# Patient Record
Sex: FEMALE | Race: ASIAN | NOT HISPANIC OR LATINO | URBAN - METROPOLITAN AREA
[De-identification: names, ages, dates, MRNs, and addresses within clinical notes are randomized per-mention and may not be internally consistent; named-entity substitution may affect disease eponyms.]

---

## 2020-02-24 ENCOUNTER — EMERGENCY (EMERGENCY)
Facility: HOSPITAL | Age: 20
LOS: 1 days | Discharge: HOME | End: 2020-02-24
Attending: EMERGENCY MEDICINE | Admitting: EMERGENCY MEDICINE
Payer: MEDICAID

## 2020-02-24 VITALS
DIASTOLIC BLOOD PRESSURE: 64 MMHG | RESPIRATION RATE: 18 BRPM | OXYGEN SATURATION: 100 % | HEART RATE: 70 BPM | TEMPERATURE: 98 F | SYSTOLIC BLOOD PRESSURE: 104 MMHG

## 2020-02-24 LAB
ALBUMIN SERPL ELPH-MCNC: 4.3 G/DL — SIGNIFICANT CHANGE UP (ref 3.3–5)
ALP SERPL-CCNC: 51 U/L — SIGNIFICANT CHANGE UP (ref 40–120)
ALT FLD-CCNC: 15 U/L — SIGNIFICANT CHANGE UP (ref 4–33)
ANION GAP SERPL CALC-SCNC: 13 MMO/L — SIGNIFICANT CHANGE UP (ref 7–14)
AST SERPL-CCNC: 15 U/L — SIGNIFICANT CHANGE UP (ref 4–32)
BILIRUB SERPL-MCNC: 0.7 MG/DL — SIGNIFICANT CHANGE UP (ref 0.2–1.2)
BUN SERPL-MCNC: 9 MG/DL — SIGNIFICANT CHANGE UP (ref 7–23)
CALCIUM SERPL-MCNC: 9.7 MG/DL — SIGNIFICANT CHANGE UP (ref 8.4–10.5)
CHLORIDE SERPL-SCNC: 102 MMOL/L — SIGNIFICANT CHANGE UP (ref 98–107)
CO2 SERPL-SCNC: 22 MMOL/L — SIGNIFICANT CHANGE UP (ref 22–31)
CREAT SERPL-MCNC: 0.65 MG/DL — SIGNIFICANT CHANGE UP (ref 0.5–1.3)
GLUCOSE SERPL-MCNC: 85 MG/DL — SIGNIFICANT CHANGE UP (ref 70–99)
HCT VFR BLD CALC: 37.9 % — SIGNIFICANT CHANGE UP (ref 34.5–45)
HGB BLD-MCNC: 11.7 G/DL — SIGNIFICANT CHANGE UP (ref 11.5–15.5)
MCHC RBC-ENTMCNC: 23.8 PG — LOW (ref 27–34)
MCHC RBC-ENTMCNC: 30.9 % — LOW (ref 32–36)
MCV RBC AUTO: 77.2 FL — LOW (ref 80–100)
NRBC # FLD: 0 K/UL — SIGNIFICANT CHANGE UP (ref 0–0)
PLATELET # BLD AUTO: 164 K/UL — SIGNIFICANT CHANGE UP (ref 150–400)
PMV BLD: 13.5 FL — HIGH (ref 7–13)
POTASSIUM SERPL-MCNC: 3.9 MMOL/L — SIGNIFICANT CHANGE UP (ref 3.5–5.3)
POTASSIUM SERPL-SCNC: 3.9 MMOL/L — SIGNIFICANT CHANGE UP (ref 3.5–5.3)
PROT SERPL-MCNC: 7.7 G/DL — SIGNIFICANT CHANGE UP (ref 6–8.3)
RBC # BLD: 4.91 M/UL — SIGNIFICANT CHANGE UP (ref 3.8–5.2)
RBC # FLD: 14.2 % — SIGNIFICANT CHANGE UP (ref 10.3–14.5)
SODIUM SERPL-SCNC: 137 MMOL/L — SIGNIFICANT CHANGE UP (ref 135–145)
WBC # BLD: 5.83 K/UL — SIGNIFICANT CHANGE UP (ref 3.8–10.5)
WBC # FLD AUTO: 5.83 K/UL — SIGNIFICANT CHANGE UP (ref 3.8–10.5)

## 2020-02-24 PROCEDURE — 99284 EMERGENCY DEPT VISIT MOD MDM: CPT

## 2020-02-24 RX ORDER — METOCLOPRAMIDE HCL 10 MG
10 TABLET ORAL ONCE
Refills: 0 | Status: COMPLETED | OUTPATIENT
Start: 2020-02-24 | End: 2020-02-24

## 2020-02-24 RX ORDER — KETOROLAC TROMETHAMINE 30 MG/ML
15 SYRINGE (ML) INJECTION ONCE
Refills: 0 | Status: DISCONTINUED | OUTPATIENT
Start: 2020-02-24 | End: 2020-02-24

## 2020-02-24 RX ORDER — SODIUM CHLORIDE 9 MG/ML
1000 INJECTION INTRAMUSCULAR; INTRAVENOUS; SUBCUTANEOUS ONCE
Refills: 0 | Status: COMPLETED | OUTPATIENT
Start: 2020-02-24 | End: 2020-02-24

## 2020-02-24 RX ADMIN — Medication 15 MILLIGRAM(S): at 16:27

## 2020-02-24 RX ADMIN — Medication 10 MILLIGRAM(S): at 16:00

## 2020-02-24 RX ADMIN — SODIUM CHLORIDE 1000 MILLILITER(S): 9 INJECTION INTRAMUSCULAR; INTRAVENOUS; SUBCUTANEOUS at 16:00

## 2020-02-24 NOTE — ED PROVIDER NOTE - NS_ ATTENDINGSCRIBEDETAILS _ED_A_ED_FT
DR. BLOCH, ATTENDING MD-  I performed a face to face bedside interview with patient regarding history of present illness, review of symptoms and past medical history. I completed an independent physical exam.  I have discussed patient's plan of care with the resident.

## 2020-02-24 NOTE — ED PROVIDER NOTE - PATIENT PORTAL LINK FT
You can access the FollowMyHealth Patient Portal offered by [facility] by registering at the following website: [email or web address]. By joining [employer]’s FollowMyHealth portal, you will also be able to view your health information using other applications (apps) compatible with our system.

## 2020-02-24 NOTE — ED PROVIDER NOTE - CLINICAL SUMMARY MEDICAL DECISION MAKING FREE TEXT BOX
no neuro deficits; afebrile; neck supple; has imaging just pending results; feels better after NSAIDs and reglan will d/c home

## 2020-02-24 NOTE — ED PROVIDER NOTE - OBJECTIVE STATEMENT
19 yr old feamle with no sig PMH presents with headache, states intermittent for last 1-2 years.  WEnt to to see neurologist and  states 3 imaging tests done which included MRI, to get results 3/10.  Pt had headache today similar to prior prompting her to come in.  Denies visual changes, vomiting, LOCm weakness, numbness.

## 2020-02-24 NOTE — ED PROVIDER NOTE - RAPID ASSESSMENT
18 y/o F w/ no pertinent PMH presents to the ED c/o headache, rated 8/10, since this morning. +Dizziness. +Photophobia. Pt has been having headaches for past 1.5 years and follows with a neurologist. Pt is currently waiting for results of an MRI she did 1 week ago. Pt has not taken any pain medication. Denies any cough, congestion, blurry vision, weakness, numbness, nausea, or any other acute complaints. NKDA. Initial face to face assessment completed by Dr. Bloch. Patient is well appearing and in NAD. Initial orders placed for patient. Will hand off patient to a second provider for further evaluation and management.

## 2020-02-24 NOTE — ED ADULT NURSE NOTE - OBJECTIVE STATEMENT
Pt rec'd in intake, c/o generalized intermittent headache for the past 1.5 years, associated with dizziness. Denies N/V

## 2020-02-24 NOTE — ED ADULT TRIAGE NOTE - CHIEF COMPLAINT QUOTE
pt. c/o a headache x 1 year, states she saw a neurologist 2 weeks ago, had an MRI completed but no results are yet available. Denies nausea/vomiting. Denies PMHx.

## 2020-02-24 NOTE — ED PROVIDER NOTE - NSFOLLOWUPINSTRUCTIONS_ED_ALL_ED_FT
Follow up with your neurologist as scheduled  Return to ED for worsening headache, vomiting, any motor weakness or numbness or any other complaints in which you feel you require immediate treatment

## 2020-08-17 ENCOUNTER — EMERGENCY (EMERGENCY)
Facility: HOSPITAL | Age: 20
LOS: 1 days | Discharge: HOME | End: 2020-08-17
Attending: STUDENT IN AN ORGANIZED HEALTH CARE EDUCATION/TRAINING PROGRAM | Admitting: STUDENT IN AN ORGANIZED HEALTH CARE EDUCATION/TRAINING PROGRAM
Payer: MEDICAID

## 2020-08-17 VITALS
HEART RATE: 125 BPM | SYSTOLIC BLOOD PRESSURE: 122 MMHG | DIASTOLIC BLOOD PRESSURE: 46 MMHG | RESPIRATION RATE: 20 BRPM | TEMPERATURE: 102 F | OXYGEN SATURATION: 97 %

## 2020-08-17 VITALS
HEART RATE: 111 BPM | RESPIRATION RATE: 16 BRPM | DIASTOLIC BLOOD PRESSURE: 48 MMHG | SYSTOLIC BLOOD PRESSURE: 91 MMHG | OXYGEN SATURATION: 100 % | TEMPERATURE: 98 F

## 2020-08-17 LAB
ALBUMIN SERPL ELPH-MCNC: 4.8 G/DL — SIGNIFICANT CHANGE UP (ref 3.3–5)
ALP SERPL-CCNC: 65 U/L — SIGNIFICANT CHANGE UP (ref 40–120)
ALT FLD-CCNC: 15 U/L — SIGNIFICANT CHANGE UP (ref 4–33)
ANION GAP SERPL CALC-SCNC: 14 MMO/L — SIGNIFICANT CHANGE UP (ref 7–14)
APPEARANCE UR: CLEAR — SIGNIFICANT CHANGE UP
APTT BLD: 30.4 SEC — SIGNIFICANT CHANGE UP (ref 27–36.3)
AST SERPL-CCNC: 17 U/L — SIGNIFICANT CHANGE UP (ref 4–32)
BASOPHILS # BLD AUTO: 0.02 K/UL — SIGNIFICANT CHANGE UP (ref 0–0.2)
BASOPHILS NFR BLD AUTO: 0.2 % — SIGNIFICANT CHANGE UP (ref 0–2)
BILIRUB SERPL-MCNC: 1 MG/DL — SIGNIFICANT CHANGE UP (ref 0.2–1.2)
BILIRUB UR-MCNC: NEGATIVE — SIGNIFICANT CHANGE UP
BLOOD UR QL VISUAL: NEGATIVE — SIGNIFICANT CHANGE UP
BUN SERPL-MCNC: 11 MG/DL — SIGNIFICANT CHANGE UP (ref 7–23)
CALCIUM SERPL-MCNC: 9.8 MG/DL — SIGNIFICANT CHANGE UP (ref 8.4–10.5)
CHLORIDE SERPL-SCNC: 101 MMOL/L — SIGNIFICANT CHANGE UP (ref 98–107)
CO2 SERPL-SCNC: 22 MMOL/L — SIGNIFICANT CHANGE UP (ref 22–31)
COLOR SPEC: COLORLESS — SIGNIFICANT CHANGE UP
CREAT SERPL-MCNC: 0.68 MG/DL — SIGNIFICANT CHANGE UP (ref 0.5–1.3)
EOSINOPHIL # BLD AUTO: 0 K/UL — SIGNIFICANT CHANGE UP (ref 0–0.5)
EOSINOPHIL NFR BLD AUTO: 0 % — SIGNIFICANT CHANGE UP (ref 0–6)
GLUCOSE SERPL-MCNC: 92 MG/DL — SIGNIFICANT CHANGE UP (ref 70–99)
GLUCOSE UR-MCNC: NEGATIVE — SIGNIFICANT CHANGE UP
HCT VFR BLD CALC: 36.4 % — SIGNIFICANT CHANGE UP (ref 34.5–45)
HGB BLD-MCNC: 11.9 G/DL — SIGNIFICANT CHANGE UP (ref 11.5–15.5)
IMM GRANULOCYTES NFR BLD AUTO: 0.5 % — SIGNIFICANT CHANGE UP (ref 0–1.5)
INR BLD: 1.09 — SIGNIFICANT CHANGE UP (ref 0.88–1.16)
KETONES UR-MCNC: NEGATIVE — SIGNIFICANT CHANGE UP
LACTATE BLDV-MCNC: 1.8 MMOL/L — SIGNIFICANT CHANGE UP (ref 0.5–2)
LEUKOCYTE ESTERASE UR-ACNC: NEGATIVE — SIGNIFICANT CHANGE UP
LYMPHOCYTES # BLD AUTO: 0.68 K/UL — LOW (ref 1–3.3)
LYMPHOCYTES # BLD AUTO: 6.4 % — LOW (ref 13–44)
MCHC RBC-ENTMCNC: 23.6 PG — LOW (ref 27–34)
MCHC RBC-ENTMCNC: 32.7 % — SIGNIFICANT CHANGE UP (ref 32–36)
MCV RBC AUTO: 72.2 FL — LOW (ref 80–100)
MONOCYTES # BLD AUTO: 0.46 K/UL — SIGNIFICANT CHANGE UP (ref 0–0.9)
MONOCYTES NFR BLD AUTO: 4.3 % — SIGNIFICANT CHANGE UP (ref 2–14)
NEUTROPHILS # BLD AUTO: 9.45 K/UL — HIGH (ref 1.8–7.4)
NEUTROPHILS NFR BLD AUTO: 88.6 % — HIGH (ref 43–77)
NITRITE UR-MCNC: NEGATIVE — SIGNIFICANT CHANGE UP
NRBC # FLD: 0 K/UL — SIGNIFICANT CHANGE UP (ref 0–0)
PH UR: 6.5 — SIGNIFICANT CHANGE UP (ref 5–8)
PLATELET # BLD AUTO: 170 K/UL — SIGNIFICANT CHANGE UP (ref 150–400)
PMV BLD: 12 FL — SIGNIFICANT CHANGE UP (ref 7–13)
POTASSIUM SERPL-MCNC: 3.4 MMOL/L — LOW (ref 3.5–5.3)
POTASSIUM SERPL-SCNC: 3.4 MMOL/L — LOW (ref 3.5–5.3)
PROCALCITONIN SERPL-MCNC: 0.28 NG/ML — HIGH (ref 0.02–0.1)
PROT SERPL-MCNC: 7.9 G/DL — SIGNIFICANT CHANGE UP (ref 6–8.3)
PROT UR-MCNC: NEGATIVE — SIGNIFICANT CHANGE UP
PROTHROM AB SERPL-ACNC: 12.5 SEC — SIGNIFICANT CHANGE UP (ref 10.6–13.6)
RBC # BLD: 5.04 M/UL — SIGNIFICANT CHANGE UP (ref 3.8–5.2)
RBC # FLD: 14.3 % — SIGNIFICANT CHANGE UP (ref 10.3–14.5)
SODIUM SERPL-SCNC: 137 MMOL/L — SIGNIFICANT CHANGE UP (ref 135–145)
SP GR SPEC: 1.01 — SIGNIFICANT CHANGE UP (ref 1–1.04)
UROBILINOGEN FLD QL: NORMAL — SIGNIFICANT CHANGE UP
WBC # BLD: 10.66 K/UL — HIGH (ref 3.8–10.5)
WBC # FLD AUTO: 10.66 K/UL — HIGH (ref 3.8–10.5)

## 2020-08-17 PROCEDURE — 93010 ELECTROCARDIOGRAM REPORT: CPT

## 2020-08-17 PROCEDURE — 71045 X-RAY EXAM CHEST 1 VIEW: CPT | Mod: 26

## 2020-08-17 PROCEDURE — 99284 EMERGENCY DEPT VISIT MOD MDM: CPT | Mod: 25

## 2020-08-17 RX ORDER — IBUPROFEN 200 MG
600 TABLET ORAL ONCE
Refills: 0 | Status: COMPLETED | OUTPATIENT
Start: 2020-08-17 | End: 2020-08-17

## 2020-08-17 RX ORDER — IBUPROFEN 200 MG
400 TABLET ORAL ONCE
Refills: 0 | Status: DISCONTINUED | OUTPATIENT
Start: 2020-08-17 | End: 2020-08-17

## 2020-08-17 RX ORDER — ACETAMINOPHEN 500 MG
650 TABLET ORAL ONCE
Refills: 0 | Status: COMPLETED | OUTPATIENT
Start: 2020-08-17 | End: 2020-08-17

## 2020-08-17 RX ORDER — SODIUM CHLORIDE 9 MG/ML
1000 INJECTION INTRAMUSCULAR; INTRAVENOUS; SUBCUTANEOUS ONCE
Refills: 0 | Status: COMPLETED | OUTPATIENT
Start: 2020-08-17 | End: 2020-08-17

## 2020-08-17 RX ORDER — SODIUM CHLORIDE 9 MG/ML
2000 INJECTION INTRAMUSCULAR; INTRAVENOUS; SUBCUTANEOUS ONCE
Refills: 0 | Status: COMPLETED | OUTPATIENT
Start: 2020-08-17 | End: 2020-08-17

## 2020-08-17 RX ORDER — POTASSIUM CHLORIDE 20 MEQ
40 PACKET (EA) ORAL ONCE
Refills: 0 | Status: COMPLETED | OUTPATIENT
Start: 2020-08-17 | End: 2020-08-17

## 2020-08-17 RX ORDER — METOCLOPRAMIDE HCL 10 MG
10 TABLET ORAL ONCE
Refills: 0 | Status: COMPLETED | OUTPATIENT
Start: 2020-08-17 | End: 2020-08-17

## 2020-08-17 RX ADMIN — Medication 650 MILLIGRAM(S): at 18:34

## 2020-08-17 RX ADMIN — Medication 650 MILLIGRAM(S): at 19:29

## 2020-08-17 RX ADMIN — Medication 10 MILLIGRAM(S): at 22:23

## 2020-08-17 RX ADMIN — SODIUM CHLORIDE 1000 MILLILITER(S): 9 INJECTION INTRAMUSCULAR; INTRAVENOUS; SUBCUTANEOUS at 22:32

## 2020-08-17 RX ADMIN — Medication 40 MILLIEQUIVALENT(S): at 20:04

## 2020-08-17 RX ADMIN — Medication 600 MILLIGRAM(S): at 21:26

## 2020-08-17 RX ADMIN — Medication 600 MILLIGRAM(S): at 22:32

## 2020-08-17 RX ADMIN — SODIUM CHLORIDE 2000 MILLILITER(S): 9 INJECTION INTRAMUSCULAR; INTRAVENOUS; SUBCUTANEOUS at 18:34

## 2020-08-17 RX ADMIN — SODIUM CHLORIDE 1000 MILLILITER(S): 9 INJECTION INTRAMUSCULAR; INTRAVENOUS; SUBCUTANEOUS at 21:26

## 2020-08-17 NOTE — ED ADULT TRIAGE NOTE - CHIEF COMPLAINT QUOTE
p/t c/o of sudden onset of chills and fever since this afternoon, denies any recent travel, denies cough, p/t appears uncomfortable

## 2020-08-17 NOTE — ED PROVIDER NOTE - PATIENT PORTAL LINK FT
You can access the FollowMyHealth Patient Portal offered by [facility] by registering at the following website: [email or web address]. By joining [employer]’s FollowMyHealth portal, you will also be able to view your health information using other applications (apps) compatible with our system.

## 2020-08-17 NOTE — ED PROVIDER NOTE - PROGRESS NOTE DETAILS
Dr. Prince: I have personally seen and examined this patient at the bedside. I have fully participated in the care of this patient. I have reviewed all pertinent clinical information, including history, physical exam, plan and the Resident's note and agree except as noted. HPI above as by me. PE above as by me. DDX PLAN Steph MATT: Patient reassessed, reports improvement, successfully PO challenged, ambulated with a steady gait. BP: 102/61

## 2020-08-17 NOTE — ED PROVIDER NOTE - OBJECTIVE STATEMENT
Pt a 20F hx migraines p/f fever, chills, headache, since "this afternoon". Pt has also noticed she's been urinating more often. Denies cough, n/v, cp, sob, abdominal pain, dysuria, hematuria, vaginal discharge, diarrhea, neck stiffness, rashes. Pt a 20F hx migraines p/f fever, chills, headache, since "this afternoon". Pt has also noticed she's been urinating more often. Denies cough, n/v, cp, sob, abdominal pain, dysuria, hematuria, vaginal discharge, diarrhea, neck stiffness, rashes.    Pt's  is reachable at [phone] and would like updates as w/u progresses.

## 2020-08-17 NOTE — ED PROVIDER NOTE - ATTENDING CONTRIBUTION TO CARE
19 y/o F with PMH migraines p/w fever and chills x 1 day. Pt reports having chills and fever tmax 102 today. She reports having increased urination over the last few days. She denies cough, chest pain, dizziness recent, trauma or travel. LMP was 1 month prior. She reports having mild headache when fever started, denies neck stiffness.   denies +fever, +chills, chest pain, SOB, abdominal pain, diarrhea, dysuria, syncope, bleeding, new rash, weakness, numbness, blurred vision    ROS  otherwise negative as per HPI  Gen: Awake, Alert, WD, WN, NAD  Head:  NC/AT  Eyes:  PERRL, EOMI, Conjunctiva pink, lids normal, no scleral icterus  ENT: OP clear, no exudates, no erythema, uvula midline,  moist mucus membranes  Neck: supple, nontender, no meningismus, no JVD, trachea midline  Cardiac/CV:  S1 S2, RRR, no M/G/R  Respiratory/Pulm:  CTAB, good air movement, normal resp effort, no wheezes/stridor/retractions/rales/rhonchi  Gastrointestinal/Abdomen:  Soft, nontender, nondistended, +BS, no rebound/guarding  Back:  no CVAT, no MLT  Ext:  warm, well perfused, moving all extremities spontaneously, no peripheral edema, distal pulses intact  Skin: intact, no rash  Neuro:  AAOx3, sensation intact, motor 5/5 x 4 extremities, normal gait, speech clear  MDM as above 21 y/o F with PMH depression,  migraines p/w fever and chills x 1 day. Pt reports having chills and fever tmax 102 today. She reports having increased urination over the last few days. She denies cough, chest pain, dizziness recent, trauma or travel. LMP was 1 month prior. She reports having mild headache when fever started, denies neck stiffness. Denies HI/SI  denies +fever, +chills, chest pain, SOB, abdominal pain, diarrhea, dysuria, syncope, bleeding, new rash, weakness, numbness, blurred vision    ROS  otherwise negative as per HPI  Gen: Awake, Alert, WD, WN, NAD  Head:  NC/AT  Eyes:  PERRL, EOMI, Conjunctiva pink, lids normal, no scleral icterus  ENT: OP clear, no exudates, no erythema, uvula midline,  moist mucus membranes  Neck: supple, nontender, no meningismus, no JVD, trachea midline  Cardiac/CV:  S1 S2, RRR, no M/G/R  Respiratory/Pulm:  CTAB, good air movement, normal resp effort, no wheezes/stridor/retractions/rales/rhonchi  Gastrointestinal/Abdomen:  Soft, nontender, nondistended, +BS, no rebound/guarding  Back:  no CVAT, no MLT  Ext:  warm, well perfused, moving all extremities spontaneously, no peripheral edema, distal pulses intact  Skin: superficial scars from cutting on wrists in past   Neuro:  AAOx3, sensation intact, motor 5/5 x 4 extremities, normal gait, speech clear  MDM as above

## 2020-08-17 NOTE — ED PROVIDER NOTE - CLINICAL SUMMARY MEDICAL DECISION MAKING FREE TEXT BOX
20F p/f f/c, sepsis vitals on arrival; (tachy, febrile), endorses urinary frequency and headache, pt w/ supple neck, no nuchal rigidity, abdomen soft ntnd, Lungs clear. Plan - labs, cultures, urine, cxr, fluids, abx, reassess. 20F p/f f/c, sepsis vitals on arrival; (tachy, febrile), endorses urinary frequency and headache, pt w/ supple neck, no nuchal rigidity, abdomen soft ntnd, Lungs clear. Plan - labs, cultures, urine, cxr, fluids, abx, reassess. likely uti no cva tendenress

## 2020-08-17 NOTE — ED ADULT NURSE NOTE - OBJECTIVE STATEMENT
pt received in room 11 A&OX4 c/o sudden onset fever and chills, headache x today. Pt denies CP, SOB, dysuria/hematuria, abdominal pain. Pt does report increased urinary frequency. pt slightly tachypneic, sinus tachycardic on cardiac monitor. Pt febrile upon arrival. Abd soft non distended. 20G IV placed to R AC. Labs drawn and sent. Will continue to monitor.

## 2020-08-18 ENCOUNTER — INPATIENT (INPATIENT)
Facility: HOSPITAL | Age: 20
LOS: 2 days | Discharge: HOME | End: 2020-08-21
Attending: HOSPITALIST | Admitting: HOSPITALIST
Payer: MEDICAID

## 2020-08-18 VITALS
SYSTOLIC BLOOD PRESSURE: 115 MMHG | RESPIRATION RATE: 16 BRPM | TEMPERATURE: 98 F | HEART RATE: 101 BPM | DIASTOLIC BLOOD PRESSURE: 61 MMHG | OXYGEN SATURATION: 100 %

## 2020-08-18 DIAGNOSIS — R78.81 BACTEREMIA: ICD-10-CM

## 2020-08-18 LAB
ALBUMIN SERPL ELPH-MCNC: 4.6 G/DL — SIGNIFICANT CHANGE UP (ref 3.3–5)
ALP SERPL-CCNC: 64 U/L — SIGNIFICANT CHANGE UP (ref 40–120)
ALT FLD-CCNC: 24 U/L — SIGNIFICANT CHANGE UP (ref 4–33)
ANION GAP SERPL CALC-SCNC: 13 MMO/L — SIGNIFICANT CHANGE UP (ref 7–14)
APPEARANCE UR: CLEAR — SIGNIFICANT CHANGE UP
APTT BLD: 29.7 SEC — SIGNIFICANT CHANGE UP (ref 27–36.3)
AST SERPL-CCNC: 28 U/L — SIGNIFICANT CHANGE UP (ref 4–32)
BASE EXCESS BLDV CALC-SCNC: -0.5 MMOL/L — SIGNIFICANT CHANGE UP
BASOPHILS # BLD AUTO: 0.03 K/UL — SIGNIFICANT CHANGE UP (ref 0–0.2)
BASOPHILS NFR BLD AUTO: 0.4 % — SIGNIFICANT CHANGE UP (ref 0–2)
BILIRUB SERPL-MCNC: 0.4 MG/DL — SIGNIFICANT CHANGE UP (ref 0.2–1.2)
BILIRUB UR-MCNC: NEGATIVE — SIGNIFICANT CHANGE UP
BLOOD GAS VENOUS - CREATININE: 0.82 MG/DL — SIGNIFICANT CHANGE UP (ref 0.5–1.3)
BLOOD UR QL VISUAL: NEGATIVE — SIGNIFICANT CHANGE UP
BUN SERPL-MCNC: 9 MG/DL — SIGNIFICANT CHANGE UP (ref 7–23)
CALCIUM SERPL-MCNC: 9.2 MG/DL — SIGNIFICANT CHANGE UP (ref 8.4–10.5)
CHLORIDE BLDV-SCNC: 106 MMOL/L — SIGNIFICANT CHANGE UP (ref 96–108)
CHLORIDE SERPL-SCNC: 102 MMOL/L — SIGNIFICANT CHANGE UP (ref 98–107)
CO2 SERPL-SCNC: 23 MMOL/L — SIGNIFICANT CHANGE UP (ref 22–31)
COLOR SPEC: COLORLESS — SIGNIFICANT CHANGE UP
CREAT SERPL-MCNC: 0.77 MG/DL — SIGNIFICANT CHANGE UP (ref 0.5–1.3)
CULTURE RESULTS: SIGNIFICANT CHANGE UP
ENTEROCOC DNA BLD POS QL NAA+NON-PROBE: SIGNIFICANT CHANGE UP
EOSINOPHIL # BLD AUTO: 0 K/UL — SIGNIFICANT CHANGE UP (ref 0–0.5)
EOSINOPHIL NFR BLD AUTO: 0 % — SIGNIFICANT CHANGE UP (ref 0–6)
GAS PNL BLDV: 138 MMOL/L — SIGNIFICANT CHANGE UP (ref 136–146)
GLUCOSE BLDV-MCNC: 95 MG/DL — SIGNIFICANT CHANGE UP (ref 70–99)
GLUCOSE SERPL-MCNC: 93 MG/DL — SIGNIFICANT CHANGE UP (ref 70–99)
GLUCOSE UR-MCNC: NEGATIVE — SIGNIFICANT CHANGE UP
GRAM STN FLD: SIGNIFICANT CHANGE UP
HCO3 BLDV-SCNC: 23 MMOL/L — SIGNIFICANT CHANGE UP (ref 20–27)
HCT VFR BLD CALC: 36 % — SIGNIFICANT CHANGE UP (ref 34.5–45)
HCT VFR BLDV CALC: 37.6 % — SIGNIFICANT CHANGE UP (ref 34.5–45)
HGB BLD-MCNC: 11.5 G/DL — SIGNIFICANT CHANGE UP (ref 11.5–15.5)
HGB BLDV-MCNC: 12.2 G/DL — SIGNIFICANT CHANGE UP (ref 11.5–15.5)
IMM GRANULOCYTES NFR BLD AUTO: 0.5 % — SIGNIFICANT CHANGE UP (ref 0–1.5)
INR BLD: 1.2 — HIGH (ref 0.88–1.16)
KETONES UR-MCNC: NEGATIVE — SIGNIFICANT CHANGE UP
LACTATE BLDV-MCNC: 1.5 MMOL/L — SIGNIFICANT CHANGE UP (ref 0.5–2)
LEUKOCYTE ESTERASE UR-ACNC: NEGATIVE — SIGNIFICANT CHANGE UP
LYMPHOCYTES # BLD AUTO: 0.69 K/UL — LOW (ref 1–3.3)
LYMPHOCYTES # BLD AUTO: 8.3 % — LOW (ref 13–44)
MCHC RBC-ENTMCNC: 23.8 PG — LOW (ref 27–34)
MCHC RBC-ENTMCNC: 31.9 % — LOW (ref 32–36)
MCV RBC AUTO: 74.5 FL — LOW (ref 80–100)
METHOD TYPE: SIGNIFICANT CHANGE UP
MONOCYTES # BLD AUTO: 0.71 K/UL — SIGNIFICANT CHANGE UP (ref 0–0.9)
MONOCYTES NFR BLD AUTO: 8.6 % — SIGNIFICANT CHANGE UP (ref 2–14)
NEUTROPHILS # BLD AUTO: 6.82 K/UL — SIGNIFICANT CHANGE UP (ref 1.8–7.4)
NEUTROPHILS NFR BLD AUTO: 82.2 % — HIGH (ref 43–77)
NITRITE UR-MCNC: NEGATIVE — SIGNIFICANT CHANGE UP
NRBC # FLD: 0 K/UL — SIGNIFICANT CHANGE UP (ref 0–0)
PCO2 BLDV: 46 MMHG — SIGNIFICANT CHANGE UP (ref 41–51)
PH BLDV: 7.35 PH — SIGNIFICANT CHANGE UP (ref 7.32–7.43)
PH UR: 7 — SIGNIFICANT CHANGE UP (ref 5–8)
PLATELET # BLD AUTO: 169 K/UL — SIGNIFICANT CHANGE UP (ref 150–400)
PMV BLD: 12.8 FL — SIGNIFICANT CHANGE UP (ref 7–13)
PO2 BLDV: 25 MMHG — LOW (ref 35–40)
POTASSIUM BLDV-SCNC: 5.3 MMOL/L — HIGH (ref 3.4–4.5)
POTASSIUM SERPL-MCNC: 3.7 MMOL/L — SIGNIFICANT CHANGE UP (ref 3.5–5.3)
POTASSIUM SERPL-SCNC: 3.7 MMOL/L — SIGNIFICANT CHANGE UP (ref 3.5–5.3)
PROT SERPL-MCNC: 7.8 G/DL — SIGNIFICANT CHANGE UP (ref 6–8.3)
PROT UR-MCNC: NEGATIVE — SIGNIFICANT CHANGE UP
PROTHROM AB SERPL-ACNC: 13.6 SEC — SIGNIFICANT CHANGE UP (ref 10.6–13.6)
RBC # BLD: 4.83 M/UL — SIGNIFICANT CHANGE UP (ref 3.8–5.2)
RBC # FLD: 14.7 % — HIGH (ref 10.3–14.5)
SAO2 % BLDV: 38.7 % — LOW (ref 60–85)
SARS-COV-2 RNA SPEC QL NAA+PROBE: SIGNIFICANT CHANGE UP
SODIUM SERPL-SCNC: 138 MMOL/L — SIGNIFICANT CHANGE UP (ref 135–145)
SP GR SPEC: 1.01 — SIGNIFICANT CHANGE UP (ref 1–1.04)
SPECIMEN SOURCE: SIGNIFICANT CHANGE UP
SPECIMEN SOURCE: SIGNIFICANT CHANGE UP
UROBILINOGEN FLD QL: NORMAL — SIGNIFICANT CHANGE UP
WBC # BLD: 8.29 K/UL — SIGNIFICANT CHANGE UP (ref 3.8–10.5)
WBC # FLD AUTO: 8.29 K/UL — SIGNIFICANT CHANGE UP (ref 3.8–10.5)

## 2020-08-18 PROCEDURE — 71046 X-RAY EXAM CHEST 2 VIEWS: CPT | Mod: 26

## 2020-08-18 PROCEDURE — 99285 EMERGENCY DEPT VISIT HI MDM: CPT

## 2020-08-18 RX ORDER — ENOXAPARIN SODIUM 100 MG/ML
40 INJECTION SUBCUTANEOUS DAILY
Refills: 0 | Status: DISCONTINUED | OUTPATIENT
Start: 2020-08-18 | End: 2020-08-21

## 2020-08-18 RX ORDER — CEFTRIAXONE 500 MG/1
1000 INJECTION, POWDER, FOR SOLUTION INTRAMUSCULAR; INTRAVENOUS EVERY 24 HOURS
Refills: 0 | Status: DISCONTINUED | OUTPATIENT
Start: 2020-08-19 | End: 2020-08-19

## 2020-08-18 RX ORDER — CEFTRIAXONE 500 MG/1
1000 INJECTION, POWDER, FOR SOLUTION INTRAMUSCULAR; INTRAVENOUS ONCE
Refills: 0 | Status: DISCONTINUED | OUTPATIENT
Start: 2020-08-18 | End: 2020-08-18

## 2020-08-18 RX ORDER — ACETAMINOPHEN 500 MG
650 TABLET ORAL ONCE
Refills: 0 | Status: COMPLETED | OUTPATIENT
Start: 2020-08-18 | End: 2020-08-18

## 2020-08-18 RX ORDER — SODIUM CHLORIDE 9 MG/ML
2000 INJECTION INTRAMUSCULAR; INTRAVENOUS; SUBCUTANEOUS ONCE
Refills: 0 | Status: COMPLETED | OUTPATIENT
Start: 2020-08-18 | End: 2020-08-18

## 2020-08-18 RX ORDER — VANCOMYCIN HCL 1 G
1000 VIAL (EA) INTRAVENOUS ONCE
Refills: 0 | Status: COMPLETED | OUTPATIENT
Start: 2020-08-18 | End: 2020-08-18

## 2020-08-18 RX ORDER — ACETAMINOPHEN 500 MG
650 TABLET ORAL EVERY 6 HOURS
Refills: 0 | Status: DISCONTINUED | OUTPATIENT
Start: 2020-08-18 | End: 2020-08-21

## 2020-08-18 RX ORDER — CEFTRIAXONE 500 MG/1
1 INJECTION, POWDER, FOR SOLUTION INTRAMUSCULAR; INTRAVENOUS ONCE
Refills: 0 | Status: DISCONTINUED | OUTPATIENT
Start: 2020-08-18 | End: 2020-08-18

## 2020-08-18 RX ORDER — CEFTRIAXONE 500 MG/1
1000 INJECTION, POWDER, FOR SOLUTION INTRAMUSCULAR; INTRAVENOUS ONCE
Refills: 0 | Status: COMPLETED | OUTPATIENT
Start: 2020-08-18 | End: 2020-08-18

## 2020-08-18 RX ADMIN — SODIUM CHLORIDE 1000 MILLILITER(S): 9 INJECTION INTRAMUSCULAR; INTRAVENOUS; SUBCUTANEOUS at 21:52

## 2020-08-18 RX ADMIN — Medication 250 MILLIGRAM(S): at 22:16

## 2020-08-18 RX ADMIN — Medication 650 MILLIGRAM(S): at 21:30

## 2020-08-18 NOTE — ED PROVIDER NOTE - ATTENDING CONTRIBUTION TO CARE
I have seen and examined the patient on the patient´s visit date. I have reviewed the note written by Parker Hearn  [facility], on that visit day. I have supervised and participated as necessary in the performance of procedures indicated for patient management and was available at all phases of the patient´s visit when needed. We discussed the history, physical exam findings, mnagement plan, and  medical decision making. I have made my additons, exceptions, and revisions within the chart and I agree with H and P as documented in its entirety. The data and my interpretation of any data collected from labs, interventions and imaging appear below as well as my independent medical decision making and considerations    The patient is a 20y Female who has no pertinent past medical history PTED with fever and positive blood cultures after being seen in ED yesterday for same. Tmax 103 at home with good effect. Patient c/o headache but non focal and no nuchal rigidity   Vital Signs Last 24 Hrs  T(F): 98.5 HR: 101 BP: 115/61 RR: 16 SpO2: 100% (18 Aug 2020 19:00) (100% - 100%)  PE: as described; my additions and exceptions are noted in the chart    Lab Results From Yesterday Reviewed :                        11.9   10.66 )-----------( 170      ( 17 Aug 2020 18:10 )             36.4     08-    137  |  101  |  11  ----------------------------<  92  3.4<L>   |  22  |  0.68    Ca    9.8      17 Aug 2020 18:10    TPro  7.9  /  Alb  4.8  /  TBili  1.0  /  DBili  x   /  AST  17  /  ALT  15  /  AlkPhos  65  08-17 Urinalysis Basic - ( 17 Aug 2020 18:10 ) Color: COLORLESS / Appearance: CLEAR / S.012 / pH: 6.5  Gluc: NEGATIVE / Ketone: NEGATIVE  / Bili: NEGATIVE / Urobili: NORMAL   Blood: NEGATIVE / Protein: NEGATIVE / Nitrite: NEGATIVE   Leuk Esterase: NEGATIVE / RBC: x / WBC x   Sq Epi: x / Non Sq Epi: x / Bacteria: x    IMAGING: CXR yesterday= clear lungs  MDM: fever with potentially serious pathogens (Gram positive cocci) ? source  IMP (IRISK):  Management (Plan):  IVF's  repeat blood cultures  Admit   serial exams   Repeat CXR   Monitor clinical status   reassess

## 2020-08-18 NOTE — ED ADULT NURSE NOTE - CHIEF COMPLAINT QUOTE
pt states being seen and [facility] yesterday and  being called back today for positive blood cultures. pt denies chest pain, SOB, abdominal pain, urinary sx, n/v/d.

## 2020-08-18 NOTE — ED POST DISCHARGE NOTE - RESULT SUMMARY
Pt with positive blood culture. Called pt at [phone]. Pt aware of results, states she will come to ED

## 2020-08-18 NOTE — ED PROVIDER NOTE - CLINICAL SUMMARY MEDICAL DECISION MAKING FREE TEXT BOX
20 year old female with no known PMH presents to the ED after being recalled for positive blood cultures. VS reviewed, afebrile, mild tachycardia. Physical exam grossly unremarkable, no obvious source of infection, neck supple no meningismus, neuro exam non-focal. Imp: Fever of unknown origin, Bacteremia. Plan for labs per sepsis protocol, IVF, IV abx, admit. 20 year old female with no known PMH presents to the ED after being recalled for positive blood cultures (Gram positive cocci in pairs). VS reviewed, afebrile, mild tachycardia. Physical exam grossly unremarkable, no obvious source of infection, neck supple no meningismus, neuro exam non-focal. Imp: Fever of unknown origin, Bacteremia. Plan for labs per sepsis protocol, IVF, IV abx, admit.

## 2020-08-18 NOTE — ED ADULT TRIAGE NOTE - CHIEF COMPLAINT QUOTE
pt states being seen and [facility] yesterday and  being called back today for positive blood cultures. pt denies chest pain, SOB, abdominal pain, urinary sx, n/v/d.

## 2020-08-18 NOTE — ED ADULT NURSE NOTE - OBJECTIVE STATEMENT
Received patient in intake spot 3 with abnormal lab results. patient was called back. Patient alert and oriented x3 . 20g iv started on left AC. Meds given IVF started. No c/o nausea and vomiting or abdominal pain. + headache and also patient stated that she had fever and chills at home. Will continue to monitor .

## 2020-08-18 NOTE — ED PROVIDER NOTE - OBJECTIVE STATEMENT
20 year old female with no known PMH presents to the ED after being recalled for positive blood cultures. Pt was seen in ED yesterday for evaluation of fever and discharged home after improvement. Pt states she had fever again this morning, Tmax 103F, took ibuprofen twice today (5am, 10am), still has chills. Pt reports a pounding headache of 7/10 severity, not the worse headache of her life, not worsening or progressing, denies associated neck pain/stiffness, nausea, vomiting, blurry vision; denies throat/ear pain, chest pain, dyspnea, abdominal pain, diarrhea, urinary symptoms, back pain, weakness, numbness or tingling sensation. Pt denies any associated symptoms. Denies sick contacts, recent travel or known covid exposure.

## 2020-08-19 DIAGNOSIS — Z02.9 ENCOUNTER FOR ADMINISTRATIVE EXAMINATIONS, UNSPECIFIED: ICD-10-CM

## 2020-08-19 DIAGNOSIS — R78.81 BACTEREMIA: ICD-10-CM

## 2020-08-19 DIAGNOSIS — G43.909 MIGRAINE, UNSPECIFIED, NOT INTRACTABLE, WITHOUT STATUS MIGRAINOSUS: ICD-10-CM

## 2020-08-19 DIAGNOSIS — Z29.9 ENCOUNTER FOR PROPHYLACTIC MEASURES, UNSPECIFIED: ICD-10-CM

## 2020-08-19 DIAGNOSIS — D64.9 ANEMIA, UNSPECIFIED: ICD-10-CM

## 2020-08-19 LAB
ANION GAP SERPL CALC-SCNC: 13 MMO/L — SIGNIFICANT CHANGE UP (ref 7–14)
BUN SERPL-MCNC: 6 MG/DL — LOW (ref 7–23)
CALCIUM SERPL-MCNC: 8.5 MG/DL — SIGNIFICANT CHANGE UP (ref 8.4–10.5)
CHLORIDE SERPL-SCNC: 108 MMOL/L — HIGH (ref 98–107)
CHOLEST SERPL-MCNC: 164 MG/DL — SIGNIFICANT CHANGE UP (ref 120–199)
CO2 SERPL-SCNC: 19 MMOL/L — LOW (ref 22–31)
CREAT SERPL-MCNC: 0.55 MG/DL — SIGNIFICANT CHANGE UP (ref 0.5–1.3)
FERRITIN SERPL-MCNC: 111.1 NG/ML — SIGNIFICANT CHANGE UP (ref 15–150)
GLUCOSE SERPL-MCNC: 93 MG/DL — SIGNIFICANT CHANGE UP (ref 70–99)
HBA1C BLD-MCNC: 5.3 % — SIGNIFICANT CHANGE UP (ref 4–5.6)
HCT VFR BLD CALC: 33.4 % — LOW (ref 34.5–45)
HDLC SERPL-MCNC: 64 MG/DL — SIGNIFICANT CHANGE UP (ref 45–65)
HGB BLD-MCNC: 10.6 G/DL — LOW (ref 11.5–15.5)
IRON SATN MFR SERPL: 20 UG/DL — LOW (ref 30–160)
IRON SATN MFR SERPL: 253 UG/DL — SIGNIFICANT CHANGE UP (ref 140–530)
LIPID PNL WITH DIRECT LDL SERPL: 93 MG/DL — SIGNIFICANT CHANGE UP
MAGNESIUM SERPL-MCNC: 2 MG/DL — SIGNIFICANT CHANGE UP (ref 1.6–2.6)
MCHC RBC-ENTMCNC: 23.1 PG — LOW (ref 27–34)
MCHC RBC-ENTMCNC: 31.7 % — LOW (ref 32–36)
MCV RBC AUTO: 72.9 FL — LOW (ref 80–100)
NRBC # FLD: 0 K/UL — SIGNIFICANT CHANGE UP (ref 0–0)
PHOSPHATE SERPL-MCNC: 2.2 MG/DL — LOW (ref 2.5–4.5)
PLATELET # BLD AUTO: 156 K/UL — SIGNIFICANT CHANGE UP (ref 150–400)
PMV BLD: 12.1 FL — SIGNIFICANT CHANGE UP (ref 7–13)
POTASSIUM SERPL-MCNC: 3.5 MMOL/L — SIGNIFICANT CHANGE UP (ref 3.5–5.3)
POTASSIUM SERPL-SCNC: 3.5 MMOL/L — SIGNIFICANT CHANGE UP (ref 3.5–5.3)
RBC # BLD: 4.58 M/UL — SIGNIFICANT CHANGE UP (ref 3.8–5.2)
RBC # FLD: 14.6 % — HIGH (ref 10.3–14.5)
RETICS #: 51 K/UL — SIGNIFICANT CHANGE UP (ref 25–125)
RETICS/RBC NFR: 1.1 % — SIGNIFICANT CHANGE UP (ref 0.5–2.5)
SARS-COV-2 IGG SERPL QL IA: NEGATIVE — SIGNIFICANT CHANGE UP
SARS-COV-2 IGM SERPL IA-ACNC: <0.1 INDEX — SIGNIFICANT CHANGE UP
SARS-COV-2 RNA SPEC QL NAA+PROBE: SIGNIFICANT CHANGE UP
SODIUM SERPL-SCNC: 140 MMOL/L — SIGNIFICANT CHANGE UP (ref 135–145)
TRIGL SERPL-MCNC: 68 MG/DL — SIGNIFICANT CHANGE UP (ref 10–149)
TSH SERPL-MCNC: 3.3 UIU/ML — SIGNIFICANT CHANGE UP (ref 0.27–4.2)
UIBC SERPL-MCNC: 232.9 UG/DL — SIGNIFICANT CHANGE UP (ref 110–370)
WBC # BLD: 5.79 K/UL — SIGNIFICANT CHANGE UP (ref 3.8–10.5)
WBC # FLD AUTO: 5.79 K/UL — SIGNIFICANT CHANGE UP (ref 3.8–10.5)

## 2020-08-19 PROCEDURE — 12345: CPT | Mod: NC

## 2020-08-19 PROCEDURE — 99223 1ST HOSP IP/OBS HIGH 75: CPT

## 2020-08-19 PROCEDURE — 74177 CT ABD & PELVIS W/CONTRAST: CPT | Mod: 26

## 2020-08-19 PROCEDURE — 99223 1ST HOSP IP/OBS HIGH 75: CPT | Mod: GC

## 2020-08-19 RX ORDER — SODIUM,POTASSIUM PHOSPHATES 278-250MG
1 POWDER IN PACKET (EA) ORAL ONCE
Refills: 0 | Status: COMPLETED | OUTPATIENT
Start: 2020-08-19 | End: 2020-08-19

## 2020-08-19 RX ORDER — VANCOMYCIN HCL 1 G
1000 VIAL (EA) INTRAVENOUS EVERY 12 HOURS
Refills: 0 | Status: DISCONTINUED | OUTPATIENT
Start: 2020-08-19 | End: 2020-08-20

## 2020-08-19 RX ORDER — NORTRIPTYLINE HYDROCHLORIDE 10 MG/1
25 CAPSULE ORAL AT BEDTIME
Refills: 0 | Status: DISCONTINUED | OUTPATIENT
Start: 2020-08-19 | End: 2020-08-21

## 2020-08-19 RX ORDER — VANCOMYCIN HCL 1 G
1000 VIAL (EA) INTRAVENOUS EVERY 12 HOURS
Refills: 0 | Status: DISCONTINUED | OUTPATIENT
Start: 2020-08-19 | End: 2020-08-19

## 2020-08-19 RX ORDER — NORTRIPTYLINE HYDROCHLORIDE 10 MG/1
0 CAPSULE ORAL
Qty: 30 | Refills: 0 | DISCHARGE

## 2020-08-19 RX ADMIN — Medication 250 MILLIGRAM(S): at 10:29

## 2020-08-19 RX ADMIN — Medication 250 MILLIGRAM(S): at 23:00

## 2020-08-19 RX ADMIN — NORTRIPTYLINE HYDROCHLORIDE 25 MILLIGRAM(S): 10 CAPSULE ORAL at 23:00

## 2020-08-19 RX ADMIN — Medication 650 MILLIGRAM(S): at 18:40

## 2020-08-19 RX ADMIN — CEFTRIAXONE 100 MILLIGRAM(S): 500 INJECTION, POWDER, FOR SOLUTION INTRAMUSCULAR; INTRAVENOUS at 00:26

## 2020-08-19 RX ADMIN — Medication 1 PACKET(S): at 16:59

## 2020-08-19 RX ADMIN — Medication 650 MILLIGRAM(S): at 19:49

## 2020-08-19 NOTE — H&P ADULT - NSHPSOCIALHISTORY_GEN_ALL_CORE
.   Lives with the spouse.  Denies Nicotine history.  Denies ETOH.  Denies Illicit/ Recreational drug use.  Student.  LMP 7/16/2020   PAP 2018 Normal.

## 2020-08-19 NOTE — PROGRESS NOTE ADULT - ASSESSMENT
20F with history of migraines admitted for 8/17 blood culture positive for gram positive cocci in pairs. Speciated to enterococcus.

## 2020-08-19 NOTE — PROGRESS NOTE ADULT - PROBLEM SELECTOR PLAN 3
VTE with Lovenox 40 mg sub cut daily.  Fall, Aspiratiojn, safety precautions. - Pt with Hgb of 10.6, down from 11s over past 2 days.  - F/u iron labs, retic count  - Trend CBC

## 2020-08-19 NOTE — H&P ADULT - NEGATIVE ENMT SYMPTOMS
no nasal obstruction/no post-nasal discharge/no ear pain/no tinnitus/no nasal discharge/no vertigo/no nasal congestion

## 2020-08-19 NOTE — CONSULT NOTE ADULT - ASSESSMENT
Patient is a 20 y.o. F with PMH of migraines on nortryptyline who initially presented to the ED on 8/17 due to chills, generalized body aches, occipital headache, and b/l eye pain and called back to ED on 8/18 due to Enterococcus bacteremia.

## 2020-08-19 NOTE — H&P ADULT - PROBLEM SELECTOR PLAN 4
1.  Name of PCP: Dr Azael Curran .  2.  PCP Contacted on Admission: [ ] Y    [X] N    3.  PCP contacted at Discharge: [ ] Y    [ ] N    [ ] N/A  4.  Post-Discharge Appointment Date and Location:  5.  Summary of Handoff given to PCP:

## 2020-08-19 NOTE — PROGRESS NOTE ADULT - PROBLEM SELECTOR PLAN 4
1.  Name of PCP: Dr Azael Curran .  2.  PCP Contacted on Admission: [ ] Y    [X] N    3.  PCP contacted at Discharge: [ ] Y    [ ] N    [ ] N/A  4.  Post-Discharge Appointment Date and Location:  5.  Summary of Handoff given to PCP: 1.  Name of PCP: Dr Azael Curran .  2.  PCP Contacted on Admission: [x ] Y    [ ] N    3.  PCP contacted at Discharge: [ ] Y    [ ] N    [ ] N/A  4.  Post-Discharge Appointment Date and Location:  5.  Summary of Handoff given to PCP: Told PCP of her admission for enterococcus bacteremia. VTE with Lovenox 40 mg sub cut daily.  Fall, Aspiratiojn, safety precautions.

## 2020-08-19 NOTE — CONSULT NOTE ADULT - ATTENDING COMMENTS
20 year old female presented on 8/17 with headache and fever.   She was called back to the ED on 8/18 with a positive blood culture.    1) Headache:  H/o migraine headache  Denies neck pain. No rigidity    Headache improved      2) Fever  ? due to positive blood culture    3) enterococcal bacteremia  Not a typical contaminant  UA not suggestive of  focus    Repeat blood culture  check Echo    check CT a/p     Check strongyloides

## 2020-08-19 NOTE — H&P ADULT - RS GEN PE MLT RESP DETAILS PC
no subcutaneous emphysema/no chest wall tenderness/no rhonchi/no wheezes/good air movement/no rales/respirations non-labored/airway patent/no intercostal retractions/clear to auscultation bilaterally/breath sounds equal

## 2020-08-19 NOTE — CONSULT NOTE ADULT - SUBJECTIVE AND OBJECTIVE BOX
***INCOMPLETE***    Consultation Requested by: Primary team    Patient is a 20y old  Female who presents with a chief complaint of Positive blood culture (19 Aug 2020 00:43)    HPI:    Patient is a 20 y.o. F with PMH of migraines on nortryptyline who initially presented to the ED on  due to chills, generalized body aches, occipital headache, and b/l eye pain. BCx x2 and UCx were drawn and patient was sent home. Patient was called back on  due to 1/2 BCx growing enterococcus spp. Patient reported to have the same headache, chills, and body aches at  AM, but went away after taking ibuprofen. Denied photophobia, nuchal regidity, HA, SOB, chest pain, palpitations, n/v, constipation, abdominal pain, dysuria, dizziness, and falls. Patient noticed urinary frequency, but was due to drinking lots of water. At the ED, patient's vitals showed /61, , RR 16, T 98.5, and 100% on RA. WBC was 8.29, UA negative, UCx NGTD, COVID negative, and CXR negative. Received Ceftriaxone 1 g IV and Vancomycin 1g IV.     Infectious disease team was consulted for 1/2 BCx growing enterococcus spp. Today, patient is doing well. Had headache, but thinks because of usual migraine and due to missing nortryptyline. Otherwise, denies vision changes, sinus congestion, throat pain, trouble breathing, chest pain, abdominal pain, diarrhea, dysuria, hematuria, urinary frequency, or LE pain. Noted to have left arm pain due to IV line, but did not look swelled up.     REVIEW OF SYSTEMS  All review of systems negative, except for those marked:  General:		[] Abnormal:  	[] Night Sweats		[] Fever		[] Weight Loss  Pulmonary/Cough:	[] Abnormal:  Cardiac/Chest Pain:	[] Abnormal:  Gastrointestinal:	[] Abnormal:  Eyes:			[] Abnormal:  ENT:			[] Abnormal:  Dysuria:		[] Abnormal:  Musculoskeletal	:	[] Abnormal:  Endocrine:		[] Abnormal:  Lymph Nodes:		[] Abnormal:  Headache:		[x] Abnormal: frontal headache this AM.  Skin:			[] Abnormal:  Allergy/Immune:	[] Abnormal:  Psychiatric:		[] Abnormal:  [x] All other review of systems negative  [] Unable to obtain (explain):    Recent Ill Contacts:	[x] No	[] Yes:  Recent Travel History:	[x] No	[] Yes:  Recent Animal/Insect Exposure/Tick Bites:	[x] No	[] Yes:    Allergies    No Known Allergies    Intolerances      Antimicrobials:  vancomycin  IVPB 1000 milliGRAM(s) IV Intermittent every 12 hours      Other Medications:  acetaminophen    Suspension .. 650 milliGRAM(s) Oral every 6 hours PRN  enoxaparin Injectable 40 milliGRAM(s) SubCutaneous daily  nortriptyline 25 milliGRAM(s) Oral at bedtime      FAMILY HISTORY:  No pertinent family history in first degree relatives    PAST MEDICAL & SURGICAL HISTORY:  Migraine  No significant past surgical history    SOCIAL HISTORY:  Lives with her  at [place]  Sexually active with her , no hx of STI  No pets  No smoking, alcohol, or drug use  No recent travel out of   Born in [facility]    Daily Height in cm: 160.02 (19 Aug 2020 00:43)    Daily   Head Circumference:  Vital Signs Last 24 Hrs  T(C): 36.8 (19 Aug 2020 06:40), Max: 36.9 (18 Aug 2020 19:00)  T(F): 98.2 (19 Aug 2020 06:40), Max: 98.5 (18 Aug 2020 19:00)  HR: 92 (19 Aug 2020 06:40) (83 - 101)  BP: 104/69 (19 Aug 2020 06:40) (104/69 - 115/61)  BP(mean): --  RR: 17 (19 Aug 2020 06:40) (16 - 18)  SpO2: 97% (19 Aug 2020 06:40) (97% - 100%)    PHYSICAL EXAM  All physical exam findings normal, except for those marked:  General:	Normal: alert, neither acutely nor chronically ill-appearing, well developed/well   .		nourished, no respiratory distress  .		[] Abnormal:  Eyes		Normal: no conjunctival injection, no discharge, no photophobia, intact   .		extraocular movements, sclera not icteric  .		[] Abnormal:  Neck		Normal: supple, full range of motion, no nuchal rigidity  .		[] Abnormal:  Lymph Nodes	Normal: normal size and consistency, non-tender  .		[] Abnormal:  Cardiovascular	Normal: regular rate and variability; Normal S1, S2; No murmur  .		[] Abnormal:  Respiratory	Normal: no wheezing or crackles, bilateral audible breath sounds, no retractions  .		[] Abnormal:  Abdominal	Normal: soft; non-distended; non-tender; no hepatosplenomegaly or masses  .		[] Abnormal:  		Normal: normal external genitalia, no rash  .		[] Abnormal:  Extremities	Normal: FROM x4, no cyanosis or edema, symmetric pulses  .		[] Abnormal:  Skin		Normal: skin intact and not indurated; no rash, no desquamation  .		[] Abnormal:  Neurologic	Normal: alert, oriented as age-appropriate, affect appropriate; no weakness, no   .		facial asymmetry, moves all extremities, normal gait-child older than 18 months  .		[] Abnormal:  Musculoskeletal		Normal: no joint swelling, erythema, or tenderness; full range of motion   .			with no contractures; no muscle tenderness; no clubbing; no cyanosis;   .			no edema  .			[x] Abnormal: LUE pain due to IV line, but no erythema, swelling, or drainage    Respiratory Support:		[x] No	[] Yes:  Vasoactive medication infusion:	[x] No	[] Yes:  Venous catheters:		[x] No	[] Yes:  Bladder catheter:		[x] No	[] Yes:  Other catheters or tubes:	[x] No	[] Yes:    Lab Results:                        10.6   5.79  )-----------( 156      ( 19 Aug 2020 06:55 )             33.4     08-19    140  |  108<H>  |  6<L>  ----------------------------<  93  3.5   |  19<L>  |  0.55    Ca    8.5      19 Aug 2020 06:55  Phos  2.2     -  Mg     2.0     -    TPro  7.8  /  Alb  4.6  /  TBili  0.4  /  DBili  x   /  AST  28  /  ALT  24  /  AlkPhos  64  08-18    LIVER FUNCTIONS - ( 18 Aug 2020 21:30 )  Alb: 4.6 g/dL / Pro: 7.8 g/dL / ALK PHOS: 64 u/L / ALT: 24 u/L / AST: 28 u/L / GGT: x           PT/INR - ( 18 Aug 2020 21:30 )   PT: 13.6 SEC;   INR: 1.20          PTT - ( 18 Aug 2020 21:30 )  PTT:29.7 SEC  Urinalysis Basic - ( 18 Aug 2020 21:30 )  Color: COLORLESS / Appearance: CLEAR / S.008 / pH: 7.0  Gluc: NEGATIVE / Ketone: NEGATIVE  / Bili: NEGATIVE / Urobili: NORMAL   Blood: NEGATIVE / Protein: NEGATIVE / Nitrite: NEGATIVE   Leuk Esterase: NEGATIVE / RBC: x / WBC x   Sq Epi: x / Non Sq Epi: x / Bacteria: x    Culture - Blood (20 @ 21:07)    Gram Stain:   Growth in aerobic bottle: Gram positive cocci in pairs    -  Enterococcus species: Detec    Specimen Source: .Blood Blood-Peripheral    Organism: Blood Culture PCR    Culture Results:   Growth in aerobic bottle: Gram positive cocci in pairs    Culture - Blood (20 @ 21:07)    Specimen Source: .Blood Blood-Peripheral    Culture Results:   No growth to date.      Culture - Urine (20 @ 19:52)    Specimen Source: .Urine Clean Catch (Midstream)    Culture Results:   <10,000 CFU/mL Normal Urogenital Hafsa      < from: Xray Chest 2 Views PA/Lat (20 @ 22:29) >  EXAM:  XR CHEST PA LAT 2V        PROCEDURE DATE:  Aug 18 2020         INTERPRETATION:  CLINICAL INDICATION: Sepsis    TECHNIQUE: 2 views of the chest was obtained.    COMPARISON: Chest radiograph 2020.    FINDINGS:    Lungs are clear. No pleural effusion or pneumothorax.  Cardiac size is normal. No acute osseous finding.    IMPRESSION:  Clear lungs            IVETT THOMPSON M.D., RADIOLOGY RESIDENT  This document has been electronically signed.  DOYLE ALCARAZ M.D., ATTENDING RADIOLOGIST  This document has been electronically signed. Aug 19 2020  9:21AM    MICROBIOLOGY    [] Case discussed with an attending physician in addition to the patient's primary physician ***INCOMPLETE***    Consultation Requested by: Primary team    Patient is a 20y old  Female who presents with a chief complaint of Positive blood culture (19 Aug 2020 00:43)    HPI:    Patient is a 20 y.o. F with PMH of migraines on nortryptyline who initially presented to the ED on  due to chills, generalized body aches, occipital headache, and b/l eye pain. BCx x2 and UCx were drawn and patient was sent home. Patient was called back on  due to 1/2 BCx growing enterococcus spp. Patient reported to have the same headache, chills, and body aches at  AM, but went away after taking ibuprofen. Denied photophobia, nuchal regidity, HA, SOB, chest pain, palpitations, n/v, constipation, abdominal pain, dysuria, dizziness, and falls. Patient noticed urinary frequency, but was due to drinking lots of water. At the ED, patient's vitals showed /61, , RR 16, T 98.5, and 100% on RA. WBC was 8.29, UA negative, UCx NGTD, COVID negative, and CXR negative. Received Ceftriaxone 1 g IV and Vancomycin 1g IV.     Infectious disease team was consulted for 1/2 BCx growing enterococcus spp. Repeat BCx x2 & UCx x1 were done on  and are pending. Today, patient is doing well. Overnight, vitals have been stable T 98.2, HR 92, /69, RR 17, and 97% on RA. Had headache this AM, but thinks because of usual migraine and due to missing nortryptyline. Otherwise, denies vision changes, sinus congestion, throat pain, trouble breathing, chest pain, abdominal pain, diarrhea, dysuria, hematuria, urinary frequency, or LE pain. Noted to have left arm pain due to IV line, but did not look swelled up. Patient noted to eaten seafood about a week ago, but did not exhibit any signs of diarrhea, fever, or chills until .    REVIEW OF SYSTEMS  All review of systems negative, except for those marked:  General:		[] Abnormal:  	[] Night Sweats		[] Fever		[] Weight Loss  Pulmonary/Cough:	[] Abnormal:  Cardiac/Chest Pain:	[] Abnormal:  Gastrointestinal:	[] Abnormal:  Eyes:			[] Abnormal:  ENT:			[] Abnormal:  Dysuria:		[] Abnormal:  Musculoskeletal	:	[] Abnormal:  Endocrine:		[] Abnormal:  Lymph Nodes:		[] Abnormal:  Headache:		[x] Abnormal: frontal headache this AM.  Skin:			[] Abnormal:  Allergy/Immune:	[] Abnormal:  Psychiatric:		[] Abnormal:  [x] All other review of systems negative  [] Unable to obtain (explain):    Recent Ill Contacts:	[x] No	[] Yes:  Recent Travel History:	[x] No	[] Yes:  Recent Animal/Insect Exposure/Tick Bites:	[x] No	[] Yes:    Allergies    No Known Allergies    Intolerances      Antimicrobials:  vancomycin  IVPB 1000 milliGRAM(s) IV Intermittent every 12 hours      Other Medications:  acetaminophen    Suspension .. 650 milliGRAM(s) Oral every 6 hours PRN  enoxaparin Injectable 40 milliGRAM(s) SubCutaneous daily  nortriptyline 25 milliGRAM(s) Oral at bedtime      FAMILY HISTORY:  No pertinent family history in first degree relatives    PAST MEDICAL & SURGICAL HISTORY:  Migraine  No significant past surgical history    SOCIAL HISTORY:  Lives with her  at [place]  Sexually active with her , no hx of STI  No pets  No smoking, alcohol, or drug use  No recent travel out of   Born in [facility]    Daily Height in cm: 160.02 (19 Aug 2020 00:43)    Daily   Head Circumference:  Vital Signs Last 24 Hrs  T(C): 36.8 (19 Aug 2020 06:40), Max: 36.9 (18 Aug 2020 19:00)  T(F): 98.2 (19 Aug 2020 06:40), Max: 98.5 (18 Aug 2020 19:00)  HR: 92 (19 Aug 2020 06:40) (83 - 101)  BP: 104/69 (19 Aug 2020 06:40) (104/69 - 115/61)  BP(mean): --  RR: 17 (19 Aug 2020 06:40) (16 - 18)  SpO2: 97% (19 Aug 2020 06:40) (97% - 100%)    PHYSICAL EXAM  All physical exam findings normal, except for those marked:  General:	Normal: alert, neither acutely nor chronically ill-appearing, well developed/well   .		nourished, no respiratory distress  .		[] Abnormal:  Eyes		Normal: no conjunctival injection, no discharge, no photophobia, intact   .		extraocular movements, sclera not icteric  .		[] Abnormal:  Neck		Normal: supple, full range of motion, no nuchal rigidity  .		[] Abnormal:  Lymph Nodes	Normal: normal size and consistency, non-tender  .		[] Abnormal:  Cardiovascular	Normal: regular rate and variability; Normal S1, S2; No murmur  .		[] Abnormal:  Respiratory	Normal: no wheezing or crackles, bilateral audible breath sounds, no retractions  .		[] Abnormal:  Abdominal	Normal: soft; non-distended; non-tender; no hepatosplenomegaly or masses  .		[] Abnormal:  		Normal: normal external genitalia, no rash  .		[] Abnormal:  Extremities	Normal: FROM x4, no cyanosis or edema, symmetric pulses  .		[] Abnormal:  Skin		Normal: skin intact and not indurated; no rash, no desquamation  .		[] Abnormal:  Neurologic	Normal: alert, oriented as age-appropriate, affect appropriate; no weakness, no   .		facial asymmetry, moves all extremities  .		[] Abnormal:  Musculoskeletal		Normal: no joint swelling, erythema, or tenderness; full range of motion   .			with no contractures; no muscle tenderness; no clubbing; no cyanosis;   .			no edema  .			[x] Abnormal: LUE pain due to IV line, but no erythema, swelling, or drainage    Respiratory Support:		[x] No	[] Yes:  Vasoactive medication infusion:	[x] No	[] Yes:  Venous catheters:		[x] No	[] Yes:  Bladder catheter:		[x] No	[] Yes:  Other catheters or tubes:	[x] No	[] Yes:    Lab Results:                        10.6   5.79  )-----------( 156      ( 19 Aug 2020 06:55 )             33.4     08-    140  |  108<H>  |  6<L>  ----------------------------<  93  3.5   |  19<L>  |  0.55    Ca    8.5      19 Aug 2020 06:55  Phos  2.2     08-  Mg     2.0     -    TPro  7.8  /  Alb  4.6  /  TBili  0.4  /  DBili  x   /  AST  28  /  ALT  24  /  AlkPhos  64  08-18    LIVER FUNCTIONS - ( 18 Aug 2020 21:30 )  Alb: 4.6 g/dL / Pro: 7.8 g/dL / ALK PHOS: 64 u/L / ALT: 24 u/L / AST: 28 u/L / GGT: x           PT/INR - ( 18 Aug 2020 21:30 )   PT: 13.6 SEC;   INR: 1.20          PTT - ( 18 Aug 2020 21:30 )  PTT:29.7 SEC  Urinalysis Basic - ( 18 Aug 2020 21:30 )  Color: COLORLESS / Appearance: CLEAR / S.008 / pH: 7.0  Gluc: NEGATIVE / Ketone: NEGATIVE  / Bili: NEGATIVE / Urobili: NORMAL   Blood: NEGATIVE / Protein: NEGATIVE / Nitrite: NEGATIVE   Leuk Esterase: NEGATIVE / RBC: x / WBC x   Sq Epi: x / Non Sq Epi: x / Bacteria: x    Culture - Blood (20 @ 21:07)    Gram Stain:   Growth in aerobic bottle: Gram positive cocci in pairs    -  Enterococcus species: Detec    Specimen Source: .Blood Blood-Peripheral    Organism: Blood Culture PCR    Culture Results:   Growth in aerobic bottle: Gram positive cocci in pairs    Culture - Blood (20 @ 21:07)    Specimen Source: .Blood Blood-Peripheral    Culture Results:   No growth to date.      Culture - Urine (20 @ 19:52)    Specimen Source: .Urine Clean Catch (Midstream)    Culture Results:   <10,000 CFU/mL Normal Urogenital Hafsa      < from: Xray Chest 2 Views PA/Lat (20 @ 22:29) >  EXAM:  XR CHEST PA LAT 2V        PROCEDURE DATE:  Aug 18 2020         INTERPRETATION:  CLINICAL INDICATION: Sepsis    TECHNIQUE: 2 views of the chest was obtained.    COMPARISON: Chest radiograph 2020.    FINDINGS:    Lungs are clear. No pleural effusion or pneumothorax.  Cardiac size is normal. No acute osseous finding.    IMPRESSION:  Clear lungs            IVETT THOMPSON M.D., RADIOLOGY RESIDENT  This document has been electronically signed.  DOYLE ALCARAZ M.D., ATTENDING RADIOLOGIST  This document has been electronically signed. Aug 19 2020  9:21AM Consultation Requested by: Primary team    Patient is a 20y old  Female who presents with a chief complaint of Positive blood culture (19 Aug 2020 00:43)    HPI:    Patient is a 20 y.o. F with PMH of migraines on nortryptyline who initially presented to the ED on  due to chills, generalized body aches, occipital headache, and b/l eye pain. BCx x2 and UCx were drawn and patient was sent home. Patient was called back on  due to 1/2 BCx growing enterococcus spp. Patient reported to have the same headache, chills, and body aches at  AM, but went away after taking ibuprofen. Denied photophobia, nuchal regidity, HA, SOB, chest pain, palpitations, n/v, constipation, abdominal pain, dysuria, dizziness, and falls. Patient noticed urinary frequency, but was due to drinking lots of water. At the ED, patient's vitals showed /61, , RR 16, T 98.5, and 100% on RA. WBC was 8.29, UA negative, UCx NGTD, COVID negative, and CXR negative. Received Ceftriaxone 1 g IV and Vancomycin 1g IV.     Infectious disease team was consulted for 1/2 BCx growing enterococcus spp. Repeat BCx x2 & UCx x1 were done on  and are pending. Today, patient is doing well. Overnight, vitals have been stable T 98.2, HR 92, /69, RR 17, and 97% on RA. Had headache this AM, but thinks because of usual migraine and due to missing nortryptyline. Otherwise, denies vision changes, sinus congestion, throat pain, trouble breathing, chest pain, abdominal pain, diarrhea, dysuria, hematuria, urinary frequency, or LE pain. Noted to have left arm pain due to IV line, but did not look swelled up. Patient noted to eaten seafood about a week ago, but did not exhibit any signs of diarrhea, fever, or chills until .    REVIEW OF SYSTEMS  All review of systems negative, except for those marked:  General:		[] Abnormal:  	[] Night Sweats		[] Fever		[] Weight Loss  Pulmonary/Cough:	[] Abnormal:  Cardiac/Chest Pain:	[] Abnormal:  Gastrointestinal:	[] Abnormal:  Eyes:			[] Abnormal:  ENT:			[] Abnormal:  Dysuria:		[] Abnormal:  Musculoskeletal	:	[] Abnormal:  Endocrine:		[] Abnormal:  Lymph Nodes:		[] Abnormal:  Headache:		[x] Abnormal: frontal headache this AM.  Skin:			[] Abnormal:  Allergy/Immune:	[] Abnormal:  Psychiatric:		[] Abnormal:  [x] All other review of systems negative  [] Unable to obtain (explain):    Recent Ill Contacts:	[x] No	[] Yes:  Recent Travel History:	[x] No	[] Yes:  Recent Animal/Insect Exposure/Tick Bites:	[x] No	[] Yes:    Allergies    No Known Allergies    Intolerances      Antimicrobials:  vancomycin  IVPB 1000 milliGRAM(s) IV Intermittent every 12 hours      Other Medications:  acetaminophen    Suspension .. 650 milliGRAM(s) Oral every 6 hours PRN  enoxaparin Injectable 40 milliGRAM(s) SubCutaneous daily  nortriptyline 25 milliGRAM(s) Oral at bedtime      FAMILY HISTORY:  No pertinent family history in first degree relatives    PAST MEDICAL & SURGICAL HISTORY:  Migraine  No significant past surgical history    SOCIAL HISTORY:  Lives with her  at [place]  Sexually active with her , no hx of STI  No pets  No smoking, alcohol, or drug use  No recent travel out of   Born in [facility]    Daily Height in cm: 160.02 (19 Aug 2020 00:43)    Daily   Head Circumference:  Vital Signs Last 24 Hrs  T(C): 36.8 (19 Aug 2020 06:40), Max: 36.9 (18 Aug 2020 19:00)  T(F): 98.2 (19 Aug 2020 06:40), Max: 98.5 (18 Aug 2020 19:00)  HR: 92 (19 Aug 2020 06:40) (83 - 101)  BP: 104/69 (19 Aug 2020 06:40) (104/69 - 115/61)  BP(mean): --  RR: 17 (19 Aug 2020 06:40) (16 - 18)  SpO2: 97% (19 Aug 2020 06:40) (97% - 100%)    PHYSICAL EXAM  All physical exam findings normal, except for those marked:  General:	Normal: alert, neither acutely nor chronically ill-appearing, well developed/well   .		nourished, no respiratory distress  .		[] Abnormal:  Eyes		Normal: no conjunctival injection, no discharge, no photophobia, intact   .		extraocular movements, sclera not icteric  .		[] Abnormal:  Neck		Normal: supple, full range of motion, no nuchal rigidity  .		[] Abnormal:  Lymph Nodes	Normal: normal size and consistency, non-tender  .		[] Abnormal:  Cardiovascular	Normal: regular rate and variability; Normal S1, S2; No murmur  .		[] Abnormal:  Respiratory	Normal: no wheezing or crackles, bilateral audible breath sounds, no retractions  .		[] Abnormal:  Abdominal	Normal: soft; non-distended; non-tender; no hepatosplenomegaly or masses  .		[] Abnormal:  		Normal: normal external genitalia, no rash  .		[] Abnormal:  Extremities	Normal: FROM x4, no cyanosis or edema, symmetric pulses  .		[] Abnormal:  Skin		Normal: skin intact and not indurated; no rash, no desquamation  .		[] Abnormal:  Neurologic	Normal: alert, oriented as age-appropriate, affect appropriate; no weakness, no   .		facial asymmetry, moves all extremities  .		[] Abnormal:  Musculoskeletal		Normal: no joint swelling, erythema, or tenderness; full range of motion   .			with no contractures; no muscle tenderness; no clubbing; no cyanosis;   .			no edema  .			[x] Abnormal: LUE pain due to IV line, but no erythema, swelling, or drainage    Respiratory Support:		[x] No	[] Yes:  Vasoactive medication infusion:	[x] No	[] Yes:  Venous catheters:		[x] No	[] Yes:  Bladder catheter:		[x] No	[] Yes:  Other catheters or tubes:	[x] No	[] Yes:    Lab Results:                        10.6   5.79  )-----------( 156      ( 19 Aug 2020 06:55 )             33.4     08-    140  |  108<H>  |  6<L>  ----------------------------<  93  3.5   |  19<L>  |  0.55    Ca    8.5      19 Aug 2020 06:55  Phos  2.2     -  Mg     2.0     -    TPro  7.8  /  Alb  4.6  /  TBili  0.4  /  DBili  x   /  AST  28  /  ALT  24  /  AlkPhos  64  08-18    LIVER FUNCTIONS - ( 18 Aug 2020 21:30 )  Alb: 4.6 g/dL / Pro: 7.8 g/dL / ALK PHOS: 64 u/L / ALT: 24 u/L / AST: 28 u/L / GGT: x           PT/INR - ( 18 Aug 2020 21:30 )   PT: 13.6 SEC;   INR: 1.20          PTT - ( 18 Aug 2020 21:30 )  PTT:29.7 SEC  Urinalysis Basic - ( 18 Aug 2020 21:30 )  Color: COLORLESS / Appearance: CLEAR / S.008 / pH: 7.0  Gluc: NEGATIVE / Ketone: NEGATIVE  / Bili: NEGATIVE / Urobili: NORMAL   Blood: NEGATIVE / Protein: NEGATIVE / Nitrite: NEGATIVE   Leuk Esterase: NEGATIVE / RBC: x / WBC x   Sq Epi: x / Non Sq Epi: x / Bacteria: x    Culture - Blood (20 @ 21:07)    Gram Stain:   Growth in aerobic bottle: Gram positive cocci in pairs    -  Enterococcus species: Detec    Specimen Source: .Blood Blood-Peripheral    Organism: Blood Culture PCR    Culture Results:   Growth in aerobic bottle: Gram positive cocci in pairs    Culture - Blood (20 @ 21:07)    Specimen Source: .Blood Blood-Peripheral    Culture Results:   No growth to date.      Culture - Urine (20 @ 19:52)    Specimen Source: .Urine Clean Catch (Midstream)    Culture Results:   <10,000 CFU/mL Normal Urogenital Hafsa      < from: Xray Chest 2 Views PA/Lat (20 @ 22:29) >  EXAM:  XR CHEST PA LAT 2V        PROCEDURE DATE:  Aug 18 2020         INTERPRETATION:  CLINICAL INDICATION: Sepsis    TECHNIQUE: 2 views of the chest was obtained.    COMPARISON: Chest radiograph 2020.    FINDINGS:    Lungs are clear. No pleural effusion or pneumothorax.  Cardiac size is normal. No acute osseous finding.    IMPRESSION:  Clear lungs            IVETT THOMPSON M.D., RADIOLOGY RESIDENT  This document has been electronically signed.  DOYLE ALCARAZ M.D., ATTENDING RADIOLOGIST  This document has been electronically signed. Aug 19 2020  9:21AM

## 2020-08-19 NOTE — H&P ADULT - GASTROINTESTINAL DETAILS
no bruit/no rebound tenderness/bowel sounds normal/no rigidity/no masses palpable/no distention/soft/nontender/no guarding

## 2020-08-19 NOTE — H&P ADULT - PROBLEM SELECTOR PLAN 1
Currently A febrile and symptom free.  F/U Blood, Urine culture and COVID PCR.  Continue Ceftriaxone IV  Tylenol PRN. Currently A febrile and symptom free.  F/U Blood, Urine culture and COVID PCR.  Abx on hold for now pending repeat blood cx results   Tylenol PRN.

## 2020-08-19 NOTE — H&P ADULT - HISTORY OF PRESENT ILLNESS
20F, history of migraines, experienced chills, generalized body aches, Occipital HA and b/l eye pain, went to the Ed on 8/17, had blood cultures done and was sent home. The pt was called and told to return to the Ed, due blood culture revealing gram positive cocci in pairs. The pt say she last felt HA, chills and body aches 8/18 am. Her symptoms subsided after talking Ibuprofen. Denies photophobia, nuchal rigidity, HA, SOB, PND, chest pain, palpations, nausea vomit, diarrhea, constipation, abdominal pain, dysuria, dizziness, falls.       In the Ed, the pt was give Ceftriaxone 1g IV and Vancomycin 1g IV .  UA clean  CXR preliminary: Clear.  UCG Negative and documented in point of care testing.   8/17/2020 COVID PCR: Not Detected.     Vitals: T max: 98.5 oral, HR 101b/ min, BP =115/ 61, RR= 16b/ min, SPO2= 100%ra

## 2020-08-19 NOTE — ED ADULT NURSE REASSESSMENT NOTE - NS ED NURSE REASSESS COMMENT FT1
pt appears to be resting comfortably, US guided 20g IV placed to L AC by MD Alicia. Pt denies any pain/discomfort at this time, resp even and unlabored and appears in no obv distress. Will cont to monitor.

## 2020-08-19 NOTE — CONSULT NOTE ADULT - PROBLEM SELECTOR RECOMMENDATION 9
- Initial 1/2 - Initial 1/2 BCx on 8/17 grew Enterococcus spp and patient s/p ceftriaxone and vancomycin  - Afebrile and no chills reported overnight  - Currently on vancomycin  - Repeat BCx x2, UCx, COVID Antibody on 8/18 pending - Initial 1/2 BCx on 8/17 grew Enterococcus spp and patient s/p ceftriaxone and vancomycin  - Afebrile and no chills reported overnight  - Currently on vancomycin  - Repeat BCx x2, UCx, COVID Antibody on 8/18 pending  - Would continue vancomycin until repeat BCx result comes out - Initial 1/2 BCx on 8/17 grew Enterococcus spp and patient s/p ceftriaxone and vancomycin  - Afebrile and no chills reported overnight  - Currently on vancomycin  - Repeat BCx x2, UCx, COVID Antibody on 8/18 pending  - Would continue vancomycin until repeat BCx result comes out  - Would recommend getting echo   - Would recommend sending testing for strongyloides, although less likely  - Would recommend getting HIV 1/2  - Would recommend CT abdomen and pelvis to check for source of infection

## 2020-08-19 NOTE — H&P ADULT - NEGATIVE MUSCULOSKELETAL SYMPTOMS
no arthritis/no joint swelling/no leg pain L/no arthralgia/no muscle weakness/no myalgia/no muscle cramps/no stiffness/no neck pain/no arm pain L/no arm pain R/no leg pain R

## 2020-08-19 NOTE — H&P ADULT - ASSESSMENT
20F with history of migraines admitted for 8/17 blood culture positive for gram positive cocci in pairs

## 2020-08-19 NOTE — H&P ADULT - NSHPLABSRESULTS_GEN_ALL_CORE
UA clean  CXR preliminary: Clear.  UCG Negative and documented in point of care testing.   8/17/2020 COVID PCR: Not Detected.                           11.5   8.29  )-----------( 169      ( 18 Aug 2020 21:30 )             36.0   08-18    138  |  102  |  9   ----------------------------<  93  3.7   |  23  |  0.77    Ca    9.2      18 Aug 2020 21:30    TPro  7.8  /  Alb  4.6  /  TBili  0.4  /  DBili  x   /  AST  28  /  ALT  24  /  AlkPhos  64  08-18 UA clean  CXR preliminary: Clear.  UCG Negative and documented in point of care testing.   8/17/2020 COVID PCR: Not Detected.                           11.5   8.29  )-----------( 169      ( 18 Aug 2020 21:30 )             36.0   08-18    138  |  102  |  9   ----------------------------<  93  3.7   |  23  |  0.77    Ca    9.2      18 Aug 2020 21:30    TPro  7.8  /  Alb  4.6  /  TBili  0.4  /  DBili  x   /  AST  28  /  ALT  24  /  AlkPhos  64  08-18    EKG: sinus rhythm with twi anterior leads, no prior to compare to

## 2020-08-19 NOTE — H&P ADULT - NEGATIVE NEUROLOGICAL SYMPTOMS
no paresthesias/no syncope/no difficulty walking/no confusion/no generalized seizures/no focal seizures/no loss of consciousness/no hemiparesis/no tremors/no vertigo/no loss of sensation/no weakness/no transient paralysis/no facial palsy

## 2020-08-19 NOTE — H&P ADULT - ATTENDING COMMENTS
Pt seen and examined. I discussed the case with CINTHIA Campos and agree. Pt admitted due to 1/2 blood culture positive for enterococcus species. Reported fever at home but none here. Nontoxic appearing on exam with no photophobia, neck stiffness nor current headache and denies any other symptoms during my exam. Unclear source currently. pt denies any new meds, drugs or IV drug use. Hold antimicrobials for now unless new blood cultures positive or spikes fever. Would continue vancomycin if febrile and consult ID at that point if warranted. If fevers continue would need to consider LP, echo. EKG with twi in anterior leads but no prior to compare to. No chest pain reported by pt. Should reach out to pmd to see if baseline available.    Gilles Harvey MD

## 2020-08-20 LAB
-  AMPICILLIN: SIGNIFICANT CHANGE UP
-  GENTAMICIN 500: SIGNIFICANT CHANGE UP
-  VANCOMYCIN: SIGNIFICANT CHANGE UP
ALBUMIN SERPL ELPH-MCNC: 4.2 G/DL — SIGNIFICANT CHANGE UP (ref 3.3–5)
ALP SERPL-CCNC: 59 U/L — SIGNIFICANT CHANGE UP (ref 40–120)
ALT FLD-CCNC: 24 U/L — SIGNIFICANT CHANGE UP (ref 4–33)
ANION GAP SERPL CALC-SCNC: 13 MMO/L — SIGNIFICANT CHANGE UP (ref 7–14)
AST SERPL-CCNC: 21 U/L — SIGNIFICANT CHANGE UP (ref 4–32)
BILIRUB SERPL-MCNC: 0.2 MG/DL — SIGNIFICANT CHANGE UP (ref 0.2–1.2)
BUN SERPL-MCNC: 8 MG/DL — SIGNIFICANT CHANGE UP (ref 7–23)
CALCIUM SERPL-MCNC: 9.7 MG/DL — SIGNIFICANT CHANGE UP (ref 8.4–10.5)
CHLORIDE SERPL-SCNC: 102 MMOL/L — SIGNIFICANT CHANGE UP (ref 98–107)
CO2 SERPL-SCNC: 23 MMOL/L — SIGNIFICANT CHANGE UP (ref 22–31)
CREAT SERPL-MCNC: 0.62 MG/DL — SIGNIFICANT CHANGE UP (ref 0.5–1.3)
CULTURE RESULTS: SIGNIFICANT CHANGE UP
CULTURE RESULTS: SIGNIFICANT CHANGE UP
GLUCOSE SERPL-MCNC: 118 MG/DL — HIGH (ref 70–99)
HBA1C BLD-MCNC: 5.3 % — SIGNIFICANT CHANGE UP (ref 4–5.6)
HCG UR QL: NEGATIVE — SIGNIFICANT CHANGE UP
METHOD TYPE: SIGNIFICANT CHANGE UP
ORGANISM # SPEC MICROSCOPIC CNT: SIGNIFICANT CHANGE UP
POTASSIUM SERPL-MCNC: 3.7 MMOL/L — SIGNIFICANT CHANGE UP (ref 3.5–5.3)
POTASSIUM SERPL-SCNC: 3.7 MMOL/L — SIGNIFICANT CHANGE UP (ref 3.5–5.3)
PROT SERPL-MCNC: 7.6 G/DL — SIGNIFICANT CHANGE UP (ref 6–8.3)
SODIUM SERPL-SCNC: 138 MMOL/L — SIGNIFICANT CHANGE UP (ref 135–145)
SPECIMEN SOURCE: SIGNIFICANT CHANGE UP
SPECIMEN SOURCE: SIGNIFICANT CHANGE UP
VANCOMYCIN TROUGH SERPL-MCNC: 9.2 UG/ML — LOW (ref 10–20)

## 2020-08-20 PROCEDURE — 99232 SBSQ HOSP IP/OBS MODERATE 35: CPT

## 2020-08-20 PROCEDURE — 93306 TTE W/DOPPLER COMPLETE: CPT | Mod: 26

## 2020-08-20 PROCEDURE — 99232 SBSQ HOSP IP/OBS MODERATE 35: CPT | Mod: GC

## 2020-08-20 RX ORDER — AMPICILLIN TRIHYDRATE 250 MG
2 CAPSULE ORAL EVERY 6 HOURS
Refills: 0 | Status: DISCONTINUED | OUTPATIENT
Start: 2020-08-20 | End: 2020-08-21

## 2020-08-20 RX ADMIN — Medication 250 MILLIGRAM(S): at 12:11

## 2020-08-20 RX ADMIN — Medication 216 GRAM(S): at 18:29

## 2020-08-20 RX ADMIN — Medication 216 GRAM(S): at 23:09

## 2020-08-20 RX ADMIN — NORTRIPTYLINE HYDROCHLORIDE 25 MILLIGRAM(S): 10 CAPSULE ORAL at 23:09

## 2020-08-20 NOTE — PROGRESS NOTE ADULT - ASSESSMENT
20 year old female presented on 8/17 with headache and fever.   She was called back to the ED on 8/18 with a positive blood culture.    1) Headache:  H/o migraine headache  Denies neck pain. No rigidity    Headache improved      2) Fever  ? due to positive blood culture    3) enterococcal bacteremia- Enterococcus gallinarum ID'd  UA not suggestive of  focus    Repeat blood culture  check Echo    check CT a/p     Check strongyloides    Continue vancomycin for now. 20 year old female presented on 8/17 with headache and fever.   She was called back to the ED on 8/18 with a positive blood culture.    1) Headache:  H/o migraine headache  Denies neck pain. No rigidity  Headache improved      2) Fever  ? due to positive blood culture    3) enterococcal bacteremia- Enterococcus gallinarum ID'd  UA not suggestive of  focus  CT a/p without pathology    Repeat blood culture  check Echo  Check strongyloides    Change Vancomycin to ampicillin

## 2020-08-20 NOTE — PROGRESS NOTE ADULT - PROBLEM SELECTOR PLAN 3
- Pt with Hgb of 10.6, down from 11s over past 2 days.  - Total Fe low, binding capacity and ferritin wnl  - retic count wnl  - Trend CBC  - Lipid panel wnl  - Should continue anemia w/u as an outpatient

## 2020-08-21 VITALS
OXYGEN SATURATION: 98 % | SYSTOLIC BLOOD PRESSURE: 110 MMHG | HEART RATE: 96 BPM | RESPIRATION RATE: 17 BRPM | TEMPERATURE: 98 F | DIASTOLIC BLOOD PRESSURE: 74 MMHG

## 2020-08-21 LAB
ALBUMIN SERPL ELPH-MCNC: 4.2 G/DL — SIGNIFICANT CHANGE UP (ref 3.3–5)
ALP SERPL-CCNC: 57 U/L — SIGNIFICANT CHANGE UP (ref 40–120)
ALT FLD-CCNC: 20 U/L — SIGNIFICANT CHANGE UP (ref 4–33)
ANION GAP SERPL CALC-SCNC: 15 MMO/L — HIGH (ref 7–14)
AST SERPL-CCNC: 17 U/L — SIGNIFICANT CHANGE UP (ref 4–32)
BILIRUB SERPL-MCNC: 0.3 MG/DL — SIGNIFICANT CHANGE UP (ref 0.2–1.2)
BUN SERPL-MCNC: 11 MG/DL — SIGNIFICANT CHANGE UP (ref 7–23)
CALCIUM SERPL-MCNC: 9.6 MG/DL — SIGNIFICANT CHANGE UP (ref 8.4–10.5)
CHLORIDE SERPL-SCNC: 101 MMOL/L — SIGNIFICANT CHANGE UP (ref 98–107)
CO2 SERPL-SCNC: 24 MMOL/L — SIGNIFICANT CHANGE UP (ref 22–31)
CREAT SERPL-MCNC: 0.69 MG/DL — SIGNIFICANT CHANGE UP (ref 0.5–1.3)
GLUCOSE SERPL-MCNC: 86 MG/DL — SIGNIFICANT CHANGE UP (ref 70–99)
HCT VFR BLD CALC: 35.1 % — SIGNIFICANT CHANGE UP (ref 34.5–45)
HGB BLD-MCNC: 11.6 G/DL — SIGNIFICANT CHANGE UP (ref 11.5–15.5)
MCHC RBC-ENTMCNC: 24.4 PG — LOW (ref 27–34)
MCHC RBC-ENTMCNC: 33 % — SIGNIFICANT CHANGE UP (ref 32–36)
MCV RBC AUTO: 73.7 FL — LOW (ref 80–100)
NRBC # FLD: 0 K/UL — SIGNIFICANT CHANGE UP (ref 0–0)
PLATELET # BLD AUTO: 211 K/UL — SIGNIFICANT CHANGE UP (ref 150–400)
PMV BLD: 12.1 FL — SIGNIFICANT CHANGE UP (ref 7–13)
POTASSIUM SERPL-MCNC: 3.9 MMOL/L — SIGNIFICANT CHANGE UP (ref 3.5–5.3)
POTASSIUM SERPL-SCNC: 3.9 MMOL/L — SIGNIFICANT CHANGE UP (ref 3.5–5.3)
PROT SERPL-MCNC: 7.2 G/DL — SIGNIFICANT CHANGE UP (ref 6–8.3)
RBC # BLD: 4.76 M/UL — SIGNIFICANT CHANGE UP (ref 3.8–5.2)
RBC # FLD: 14.5 % — SIGNIFICANT CHANGE UP (ref 10.3–14.5)
SODIUM SERPL-SCNC: 140 MMOL/L — SIGNIFICANT CHANGE UP (ref 135–145)
VANCOMYCIN FLD-MCNC: 3.7 UG/ML — SIGNIFICANT CHANGE UP
WBC # BLD: 5.2 K/UL — SIGNIFICANT CHANGE UP (ref 3.8–10.5)
WBC # FLD AUTO: 5.2 K/UL — SIGNIFICANT CHANGE UP (ref 3.8–10.5)

## 2020-08-21 PROCEDURE — 99239 HOSP IP/OBS DSCHRG MGMT >30: CPT | Mod: GC

## 2020-08-21 PROCEDURE — 99233 SBSQ HOSP IP/OBS HIGH 50: CPT

## 2020-08-21 RX ADMIN — Medication 216 GRAM(S): at 11:09

## 2020-08-21 RX ADMIN — Medication 216 GRAM(S): at 06:04

## 2020-08-21 NOTE — PROGRESS NOTE ADULT - PROBLEM SELECTOR PLAN 1
Blood cx from 8/17 positive for enterococcus. Repeat cx NGTD. Pt with headache but no meningitic signs and headache is similar in quality to her migraines. no clear route of enterococcus infection. Denies all common risk factors.  - Currently afebrile and symptom free  - urine cx negative  - procal low  - TTE technically difficult  - Per ID, not clear if enterococcus is pathogen or contaminant so will hold off on JILLIAN  - CTAP negative  - F/u strongyloides test   - vanco switched to ampicillin today  - Per ID, to be discharged today with 2 outpatient blood cultures on 8/24 and 8/31.  - Tylenol PRN.
- Blood cx from 8/17 positive for enterococcus. Repeat cx pending  - urine cx negative  - Currently afebrile and symptom free.  - Pt with headache but no meningitic signs and headache is similar in quality to her migraines  - Pt with no clear route of enterococcus infection. Denies all common risk factors  - TTE to evaluate for endocarditis   - c/w vancomycin  - ID following, f/u recs  - Tylenol PRN.
Blood cx from 8/17 positive for enterococcus. Repeat cx NGTD. Pt with headache but no meningitic signs and headache is similar in quality to her migraines. no clear route of enterococcus infection. Denies all common risk factors.  - Currently afebrile and symptom free  - urine cx negative  - procal low  - TTE to evaluate for endocarditis  - CTAP negative  - F/u strongyloides test   - c/w vancomycin  - monitor vanc level  - ID following, f/u recs  - Tylenol PRN.

## 2020-08-21 NOTE — DISCHARGE NOTE PROVIDER - HOSPITAL COURSE
20F, history of migraines, experienced chills, generalized body aches, Occipital HA and b/l eye pain, went to the Ed on 8/17, had blood cultures done and was sent home. The pt was called and told to return to the Ed, due blood culture revealing gram positive cocci in pairs. The pt say she last felt HA, chills and body aches 8/18 am. Her symptoms subsided after talking Ibuprofen. Denies photophobia, nuchal rigidity, HA, SOB, PND, chest pain, palpations, nausea vomit, diarrhea, constipation, abdominal pain, dysuria, dizziness, falls.           In the Ed, the pt was give Ceftriaxone 1g IV and Vancomycin 1g IV .    UA clean    CXR preliminary: Clear.    UCG Negative and documented in point of care testing.     8/17/2020 COVID PCR: Not Detected.         Vitals: T max: 98.5 oral, HR 101b/ min, BP =115/ 61, RR= 16b/ min, SPO2= 100%ra        Blood culture grew enterococcus. Pt was started on vancomycin and infectious disease was consulted. CT abdomen and pelvis was negative. UA not suggestive of  focus        Repeat blood cultures showed no growth.         During her admission, pt remained afebrile and symptom free. Her WBC remained in normal range. She complained of a headache that felt very similar to her usual migraines. She did not display any meningeal signs. She was continued on home nortryptiline.         TTE was done. Study was of poor quality due to motion. It could not definitively exclude endocarditis. ID did not feel strongly about JILLIAN given pt's lack of symptoms. On day of discharge, pt was switched from vanco to ampicillin.         Pt was incidentally found to have hemoglobin of 10.6, down from 11s at baseline. Iron studies were done showing low total iron but normal binding capacities and ferritin. She should be further worked up for anemia as an outpatient,         Per ID, she is to receive 2 blood cultures one week apart as an outpatient and is discharged without antibiotics. She and her  were informed that if she develops symptoms of illness she is to return to the ED. Strongyloides test is still pending. 20F, history of migraines, experienced chills, generalized body aches, Occipital HA and b/l eye pain, went to the Ed on 8/17, had blood cultures done and was sent home. The pt was called and told to return to the Ed, due blood culture revealing gram positive cocci in pairs. The pt say she last felt HA, chills and body aches 8/18 am. Her symptoms subsided after talking Ibuprofen. Denies photophobia, nuchal rigidity, HA, SOB, PND, chest pain, palpations, nausea vomit, diarrhea, constipation, abdominal pain, dysuria, dizziness, falls.           In the Ed, the pt was give Ceftriaxone 1g IV and Vancomycin 1g IV .    UA clean    CXR preliminary: Clear.    UCG Negative and documented in point of care testing.     8/17/2020 COVID PCR: Not Detected.         Vitals: T max: 98.5 oral, HR 101b/ min, BP =115/ 61, RR= 16b/ min, SPO2= 100%ra        Blood culture grew enterococcus in 1/4 bottles. Pt was started on vancomycin and infectious disease was consulted. Patient was switched to ampicillin.        CT abdomen and pelvis was negative. UA not suggestive of  focus.  Repeat blood cultures showed no growth.         During her admission, pt remained afebrile and symptom free. Her WBC remained in normal range. She complained of a headache that felt very similar to her usual migraines. She did not display any meningeal signs. She was continued on home nortryptiline.         TTE was done. Study was of poor quality due to motion. It could not definitively exclude endocarditis. ID did not feel strongly about JILLIAN given pt's lack of symptoms and given that only 1/4 bottles grew Enteroccocous, there was c/f possible contaminant. ID recommended d/c off abx w/ weekly blood cultures x 2 w/ outpatient f/u with ID.         She and her  were informed that if she develops symptoms of illness she is to return to the ED. Strongyloides test is still pending.

## 2020-08-21 NOTE — DISCHARGE NOTE PROVIDER - NSDCMRMEDTOKEN_GEN_ALL_CORE_FT
NORTRIPTYLIN CAP 25MG: NORTRIPTYLIN CAP 25MG:   Script for Blood Culture : Weekly blood cultures starting 8/24/2020.     To be completed 8/24/2020 and 8/31/2020.     Please have results FAXED to Dr. Salter [phone].     ICD code: R78.81

## 2020-08-21 NOTE — DISCHARGE NOTE PROVIDER - PROVIDER TOKENS
FREE:[LAST:[Bina],FIRST:[Addison],PHONE:[phone]],FAX:[(   )    -]],PROVIDER:[TOKEN:[[id number]]] PROVIDER:[TOKEN:[[id number]],FOLLOWUP:[2 weeks]],FREE:[LAST:[Bina],FIRST:[Addison],PHONE:[phone]],FAX:[(   )    -],FOLLOWUP:[2 weeks]]

## 2020-08-21 NOTE — DISCHARGE NOTE NURSING/CASE MANAGEMENT/SOCIAL WORK - PATIENT PORTAL LINK FT
You can access the FollowMyHealth Patient Portal offered by [facility] by registering at the following website: [email or web address]. By joining [employer]’s FollowMyHealth portal, you will also be able to view your health information using other applications (apps) compatible with our system.

## 2020-08-21 NOTE — PROGRESS NOTE ADULT - ATTENDING COMMENTS
Pt seen and examined. Denies fever, chills, IV rug use or recent infection.    Bacteremia: +Enterococcus, c/w Vancomycin, f/u Repeat Cx. Check TTE, f/u ID recs    Migraine: c/w notriptyline, prn tylenol    Anemia: check anemia workup, trend Hb
Pt seen and examined. Denies fever, chills    Bacteremia: +Enterococcus, per ID, possibly contaminant, repeat Cx has been negative. Plan for repeat BCx 8/24 and 8/31 per iD. Result will be faxed to Dr. Salter. TTE with no acute findings    Migraine: c/w nortriptyline, prn tylenol    Anemia: Iron deficiency. trend Hb.      Plan for discharge.  Spent 45mins on dc  outpt f/u with PCP and ID. Pt advised to return to ED should fever recur.
Pt seen and examined. Denies fever, chills    Bacteremia: +Enterococcus, switch to ampicillin per iD, Repeat Cx NGTD. Check TTE, f/u ID recs    Migraine: c/w nortriptyline, prn tylenol    Anemia: Iron deficiency. trend Hb.

## 2020-08-21 NOTE — PROGRESS NOTE ADULT - PROBLEM SELECTOR PLAN 2
- Pt with occipital headache similar to her migraines  - Continue Nortriptyline.  - Monitor for changes in quality of headache.
- Pt with headache, resolved  - Similar in quality to previous headaches  - no meningeal signs  - Continue Nortriptyline.  - Monitor for changes in quality of headache.
- Pt with occipital headache similar to her migraines  - Continue Nortriptyline.  - Monitor for changes in quality of headache.

## 2020-08-21 NOTE — DISCHARGE NOTE PROVIDER - NSDCCPCAREPLAN_GEN_ALL_CORE_FT
PRINCIPAL DISCHARGE DIAGNOSIS  Diagnosis: Bacteremia  Assessment and Plan of Treatment: You came into the hospital with fever and chills. Blood cultures grew a bacteria called enterococcus. Urinalysis showed that infection likely did not come from the urinary tract. CT of the abdomen and pelvis was negative. Echocardiogram was unconclusive for infection of the heart valves, but because you showed no symptoms, our suspicion for this is low. Your repeat blood culutre was negative for any bacteria. You were given an antibiotic called vancomycin, which was switched to ampicillin. We are discharging you without antibiotics. You are to receive 2 blood cultures as an outpatient, on 8/24 and 8/31. You will follow up with the infectious disease specialist. If you feel any signs of infection, such as fever, chills, nausea, vomiting, or headache that feels different from your migraines, you should immediately go to the emergency department.      SECONDARY DISCHARGE DIAGNOSES  Diagnosis: Anemia  Assessment and Plan of Treatment: On your blood work, we found that your hemoglobin level was slightly lower than your baseline. Your total iron level was also slightly low. This may be related to your diet, your menstrual periods, or other causes. You should have this followed up with your primary care doctor.    Diagnosis: Migraine  Assessment and Plan of Treatment: You reported a headache on admission, which felt similar to your usual migraines. We do not think you had infection of the brain, or meningitis, which can also be caused by enterococcus. We continued your home nortryptiline, which resolved your headache.

## 2020-08-21 NOTE — DISCHARGE NOTE PROVIDER - CARE PROVIDER_API CALL
Azael Curran  Phone: [phone]  Fax: (   )    -  Follow Up Time:     Brodie Salter  INFECTIOUS DISEASE  [address] [place] [address]  Phone: [phone]  Fax: [phone]  Follow Up Time: Brodie Salter  INFECTIOUS DISEASE  [address] [place] [address]  Phone: [phone]  Fax: [phone]  Follow Up Time: 2 weeks    Azael Curran  Phone: [phone]  Fax: (   )    -  Follow Up Time: 2 weeks

## 2020-08-21 NOTE — PROGRESS NOTE ADULT - PROBLEM SELECTOR PLAN 5
1.  Name of PCP: Dr Azael Curran .  2.  PCP Contacted on Admission: [x ] Y    [ ] N    3.  PCP contacted at Discharge: [ ] Y    [ ] N    [ ] N/A  4.  Post-Discharge Appointment Date and Location:  5.  Summary of Handoff given to PCP: Told PCP of her admission for enterococcus bacteremia.

## 2020-08-21 NOTE — DISCHARGE NOTE PROVIDER - CARE PROVIDERS DIRECT ADDRESSES
,DirectAddress_Unknown,[email or web address],DirectAddress_Unknown

## 2020-08-21 NOTE — PROGRESS NOTE ADULT - REASON FOR ADMISSION
Positive blood culture

## 2020-08-21 NOTE — DISCHARGE NOTE PROVIDER - NSDCFUADDAPPT_GEN_ALL_CORE_FT
Please have blood cultures drawn weekly starting 8/24/2020 end 8/31/2020.   Please fax results to Dr. Brodie Salter [phone]  You can have blood work done at [facility]

## 2020-08-21 NOTE — DISCHARGE NOTE NURSING/CASE MANAGEMENT/SOCIAL WORK - NSDCFUADDAPPT_GEN_ALL_CORE_FT
Please have blood cultures drawn weekly starting 8/24/2020 end 8/31/2020.   Please fax results to Dr. Brodie Salter [phone]  You can have blood work done at [facility]

## 2020-08-21 NOTE — PROGRESS NOTE ADULT - ASSESSMENT
20 year old female presented on 8/17 with headache and fever.   She was called back to the ED on 8/18 with a positive blood culture.    1) Headache:  H/o migraine headache  Denies neck pain. No rigidity  Headache improved      2) Fever  On initial Ed visit, had headache and increased urianry frequency.   UA have not been suggestive of UTI and Urine cx with <10 k organism      3) enterococcal bacteremia- Enterococcus gallinarum ID'd  UA not suggestive of  focus  CT a/p without pathology  Echo without obvious abnormality    Repeat blood culture when returned to ED without growth ( few hours after abx)     Check strongyloides    Not clear if enterococcus is a pathogen or contaminant    I would recommend observing off antibiotics.     repeat blood culture on 8/24 and 8/31    Discussed this with pt. Also advised immediate follow up in Ed with any urinary symptoms, abdominal pain, fever or headache. 20 year old female presented on 8/17 with headache and fever.   She was called back to the ED on 8/18 with a positive blood culture.    1) Headache:  H/o migraine headache  Denies neck pain. No rigidity  Headache improved      2) Fever  On initial Ed visit, had headache and increased urianry frequency.   UA have not been suggestive of UTI and Urine cx with <10 k organism      3) enterococcal bacteremia- Enterococcus gallinarum ID'd  UA not suggestive of  focus  CT a/p without pathology  Echo without obvious abnormality    Repeat blood culture when returned to ED without growth ( few hours after abx)     Check strongyloides    Not clear if enterococcus is a pathogen or contaminant    I would recommend observing off antibiotics.     repeat blood culture on 8/24 and 8/31    Discussed this with pt. Also advised immediate follow up in Ed with any urinary symptoms, abdominal pain, fever or headache.       Please give pt prescriptions for blood cx times tow on 8/24 and blood culture times two on 8/31    Results can be faxed to me [phone]  Pt can have blood work done at [facility]    Can follow up in my offic as needed [phone]

## 2020-08-21 NOTE — PROGRESS NOTE ADULT - SUBJECTIVE AND OBJECTIVE BOX
Follow Up:      Inverval History/ROS:Patient is a 20y old  Female who presents with a chief complaint of Positive blood culture (19 Aug 2020 12:43)    no fever  Denies abd pain      Allergies    No Known Allergies    Intolerances        ANTIMICROBIALS:  vancomycin  IVPB 1000 every 12 hours      OTHER MEDS:  acetaminophen    Suspension .. 650 milliGRAM(s) Oral every 6 hours PRN  enoxaparin Injectable 40 milliGRAM(s) SubCutaneous daily  nortriptyline 25 milliGRAM(s) Oral at bedtime      Vital Signs Last 24 Hrs  T(C): 37.1 (20 Aug 2020 06:28), Max: 37.1 (20 Aug 2020 06:28)  T(F): 98.7 (20 Aug 2020 06:28), Max: 98.7 (20 Aug 2020 06:28)  HR: 90 (20 Aug 2020 06:) (87 - 90)  BP: 100/60 (20 Aug 2020 06:28) (100/60 - 105/64)  BP(mean): --  RR: 18 (20 Aug 2020 06:28) (18 - 18)  SpO2: 100% (20 Aug 2020 06:28) (100% - 100%)    PHYSICAL EXAM:  General: [ x] non-toxic  HEAD/EYES: [ ] PERRL x[ ] white sclera [ ] icterus  ENT:  [ ] normal [ x] supple [ ] thrush [ ] pharyngeal exudate  Cardiovascular:   [ ] murmur [x ] normal [ ] PPM/AICD  Respiratory:  [x ] clear to ausculation bilaterally  GI:  [ x] soft, non-tender, normal bowel sounds  :  [ ] alonzo [ x] no CVA tenderness   Musculoskeletal:  [ ] no synovitis  Neurologic:  [ ] non-focal exam   Skin:  [x ] no rash  Lymph: [ ] no lymphadenopathy  Psychiatric:  [ ] appropriate affect [x ] alert & oriented  Lines:  [x ] no phlebitis [ ] central line                                10.6   5.79  )-----------( 156      ( 19 Aug 2020 06:55 )             33.4       08-20    138  |  102  |  8   ----------------------------<  118<H>  3.7   |  23  |  0.62    Ca    9.7      20 Aug 2020 09:07  Phos  2.2     08-19  Mg     2.0     08-19    TPro  7.6  /  Alb  4.2  /  TBili  0.2  /  DBili  x   /  AST  21  /  ALT  24  /  AlkPhos  59        Urinalysis Basic - ( 18 Aug 2020 21:30 )    Color: COLORLESS / Appearance: CLEAR / S.008 / pH: 7.0  Gluc: NEGATIVE / Ketone: NEGATIVE  / Bili: NEGATIVE / Urobili: NORMAL   Blood: NEGATIVE / Protein: NEGATIVE / Nitrite: NEGATIVE   Leuk Esterase: NEGATIVE / RBC: x / WBC x   Sq Epi: x / Non Sq Epi: x / Bacteria: x        MICROBIOLOGY:Culture Results:   No growth to date. (20 @ 02:26)  Culture Results:   No growth to date. (20 @ 02:26)  Culture Results:   <10,000 CFU/mL Normal Urogenital Hafsa (20 @ 23:49)  Culture Results:   No growth to date. (20 @ 21:07)  Culture Results:   <10,000 CFU/mL Normal Urogenital Hafsa (20 @ 19:52)  Culture Results:   Growth in aerobic bottle: Enterococcus gallinarum  "Due to technical problems, Proteus sp. will Not be reported as part of  the BCID panel until further notice"  ***Blood Panel PCR results on this specimen are available  approximately 3 hours afterthe Gram stain result.***  Gram stain, PCR, and/or culture results may not always  correspond due to difference in methodologies.  ************************************************************  This PCR assay was performed using [employer].  The following targets are tested for: Enterococcus,  vancomycin resistant enterococci, Listeria monocytogenes,  coagulase negative staphylococci, S. aureus,  methicillin resistant S. aureus, Streptococcus agalactiae  (Group B), S. pneumoniae, S. pyogenes (Group A),  Acinetobacter baumannii, Enterobacter cloacae, E. coli,  Klebsiella oxytoca, K. pneumoniae, Proteus sp.,  Serratia marcescens, Haemophilus influenzae,  Neisseria meningitidis, Pseudomonas aeruginosa, Candida  albicans, C. glabrata, C krusei, C parapsilosis,  C. tropicalis and the KPC resistance gene. (20 @ 19:34)      RADIOLOGY:
Follow Up:      Inverval History/ROS:Patient is a 20y old  Female who presents with a chief complaint of Positive blood culture (20 Aug 2020 15:14)    Denies headache  Denies fever  Denies abd pain/n/v  Denies dysuria.      Allergies    No Known Allergies    Intolerances        ANTIMICROBIALS:  ampicillin  IVPB 2 every 6 hours      OTHER MEDS:  acetaminophen    Suspension .. 650 milliGRAM(s) Oral every 6 hours PRN  enoxaparin Injectable 40 milliGRAM(s) SubCutaneous daily  nortriptyline 25 milliGRAM(s) Oral at bedtime      Vital Signs Last 24 Hrs  T(C): 36.8 (21 Aug 2020 06:04), Max: 36.8 (20 Aug 2020 20:52)  T(F): 98.2 (21 Aug 2020 06:04), Max: 98.2 (20 Aug 2020 20:52)  HR: 76 (21 Aug 2020 06:04) (73 - 82)  BP: 106/62 (21 Aug 2020 06:04) (103/67 - 121/53)  BP(mean): --  RR: 18 (21 Aug 2020 06:04) (17 - 18)  SpO2: 97% (21 Aug 2020 06:04) (97% - 99%)    PHYSICAL EXAM:  General: [ x] non-toxic  HEAD/EYES: [ ] PERRL [x ] white sclera [ ] icterus  ENT:  [ ] normal [x ] supple [ ] thrush [ ] pharyngeal exudate  Cardiovascular:   [ ] murmur [ ] normal [ ] PPM/AICD  Respiratory:  [x ] clear to ausculation bilaterally  GI:  [x ] soft, non-tender, normal bowel sounds  :  [ ] alonzo x ] no CVA tenderness   Musculoskeletal:  [ ] no synovitis  Neurologic:  [ ] non-focal exam   Skin:  x[ ] no rash  Lymph: [ ] no lymphadenopathy  Psychiatric:  [ ] appropriate affect [x ] alert & oriented  Lines:  [ x] no phlebitis [ ] central line                                11.6   5.20  )-----------( 211      ( 21 Aug 2020 07:01 )             35.1       08-21    140  |  101  |  11  ----------------------------<  86  3.9   |  24  |  0.69    Ca    9.6      21 Aug 2020 07:01    TPro  7.2  /  Alb  4.2  /  TBili  0.3  /  DBili  x   /  AST  17  /  ALT  20  /  AlkPhos  57  08-21          MICROBIOLOGY:Culture Results:   No growth to date. (08-19-20 @ 02:26)  Culture Results:   No growth to date. (08-19-20 @ 02:26)  Culture Results:   <10,000 CFU/mL Normal Urogenital Hafsa (08-18-20 @ 23:49)  Culture Results:   No growth to date. (08-17-20 @ 21:07)  Culture Results:   <10,000 CFU/mL Normal Urogenital Hafsa (08-17-20 @ 19:52)  Culture Results:   Growth in aerobic bottle: Enterococcus gallinarum  "Due to technical problems, Proteus sp. will Not be reported as part of  the BCID panel until further notice"  ***Blood Panel PCR results on this specimen are available  approximately 3 hours afterthe Gram stain result.***  Gram stain, PCR, and/or culture results may not always  correspond due to difference in methodologies.  ************************************************************  This PCR assay was performed using [employer].  The following targets are tested for: Enterococcus,  vancomycin resistant enterococci, Listeria monocytogenes,  coagulase negative staphylococci, S. aureus,  methicillin resistant S. aureus, Streptococcus agalactiae  (Group B), S. pneumoniae, S. pyogenes (Group A),  Acinetobacter baumannii, Enterobacter cloacae, E. coli,  Klebsiella oxytoca, K. pneumoniae, Proteus sp.,  Serratia marcescens, Haemophilus influenzae,  Neisseria meningitidis, Pseudomonas aeruginosa, Candida  albicans, C. glabrata, C krusei, C parapsilosis,  C. tropicalis and the KPC resistance gene. (08-17-20 @ 19:34)      RADIOLOGY:
PROGRESS NOTE:     CONTACT INFO:  Judith Lawrence MD   PGY-1  Pager: [phone] [facility]    Patient is a 20y old  Female who presents with a chief complaint of Positive blood culture (19 Aug 2020 10:53)      SUBJECTIVE / OVERNIGHT EVENTS:  No acute events overnight. Pt denies any further fever or chills. Reports current occipital headache, which is similar in quality to her migraines. Denies any photophobia or neck pain or stiffness. No changes in vision.  Denies SOB at rest, chest pain, palpitations, abdominal pain, nausea/vomiting. No urinary symptoms. No sick contacts or recent travel. Denies any needle use or drug use. States that her menstrual period is slightly late, although this is common for her.     ADDITIONAL REVIEW OF SYSTEMS:    Negative except as above.     MEDICATIONS  (STANDING):  enoxaparin Injectable 40 milliGRAM(s) SubCutaneous daily  nortriptyline 25 milliGRAM(s) Oral at bedtime  vancomycin  IVPB 1000 milliGRAM(s) IV Intermittent every 12 hours    MEDICATIONS  (PRN):  acetaminophen    Suspension .. 650 milliGRAM(s) Oral every 6 hours PRN Temp greater or equal to 38C (100.4F), Mild Pain (1 - 3), Moderate Pain (4 - 6)      CAPILLARY BLOOD GLUCOSE        I&O's Summary      PHYSICAL EXAM:  Vital Signs Last 24 Hrs  T(C): 36.8 (19 Aug 2020 06:40), Max: 36.9 (18 Aug 2020 19:00)  T(F): 98.2 (19 Aug 2020 06:40), Max: 98.5 (18 Aug 2020 19:00)  HR: 92 (19 Aug 2020 06:40) (83 - 101)  BP: 104/69 (19 Aug 2020 06:40) (104/69 - 115/61)  BP(mean): --  RR: 17 (19 Aug 2020 06:40) (16 - 18)  SpO2: 97% (19 Aug 2020 06:40) (97% - 100%)    CONSTITUTIONAL: NAD, well-developed, pleasant, well-appearing  NEURO: No focal deficits, Avery's and Brudzinski's signs negative. EOMI.  RESPIRATORY: Normal respiratory effort; lungs are clear to auscultation bilaterally  CARDIOVASCULAR: Regular rate and rhythm, normal S1 and S2, no murmur/rub/gallop; No lower extremity edema; Peripheral pulses are 2+ bilaterally  ABDOMEN: Nontender to palpation, normoactive bowel sounds, no rebound/guarding; No hepatosplenomegaly  MUSCLOSKELETAL: no clubbing or cyanosis of digits; no joint swelling or tenderness to palpation  PSYCH: A+O to person, place, and time; affect appropriate    LABS:                        10.6   5.79  )-----------( 156      ( 19 Aug 2020 06:55 )             33.4     08-19    140  |  108<H>  |  6<L>  ----------------------------<  93  3.5   |  19<L>  |  0.55    Ca    8.5      19 Aug 2020 06:55  Phos  2.2       Mg     2.0         TPro  7.8  /  Alb  4.6  /  TBili  0.4  /  DBili  x   /  AST  28  /  ALT  24  /  AlkPhos  64  08-18    PT/INR - ( 18 Aug 2020 21:30 )   PT: 13.6 SEC;   INR: 1.20          PTT - ( 18 Aug 2020 21:30 )  PTT:29.7 SEC      Urinalysis Basic - ( 18 Aug 2020 21:30 )    Color: COLORLESS / Appearance: CLEAR / S.008 / pH: 7.0  Gluc: NEGATIVE / Ketone: NEGATIVE  / Bili: NEGATIVE / Urobili: NORMAL   Blood: NEGATIVE / Protein: NEGATIVE / Nitrite: NEGATIVE   Leuk Esterase: NEGATIVE / RBC: x / WBC x   Sq Epi: x / Non Sq Epi: x / Bacteria: x        -  Enterococcus species: Detec (20 @ 21:07)    Culture - Blood (collected 17 Aug 2020 21:07)  Source: .Blood Blood-Peripheral  Gram Stain (18 Aug 2020 12:12):    Growth in aerobic bottle: Gram positive cocci in pairs  Preliminary Report (18 Aug 2020 12:12):    Growth in aerobic bottle: Gram positive cocci in pairs    "Due to technical problems, Proteus sp. will Not be reported as part of    the BCID panel until further notice" ***Blood Panel PCR results on this    specimen are available    approximately 3 hours after the Gram stain result.***    Gram stain, PCR, and/or culture results may not always    correspond due to difference in methodologies.    ************************************************************    This PCR assay was performed using [employer].    The following targets are tested for: Enterococcus,    vancomycin resistant enterococci, Listeria monocytogenes,    coagulase negative staphylococci, S. aureus,    methicillin resistant S. aureus, Streptococcus agalactiae    (Group B), S. pneumoniae, S. pyogenes (Group A),    Acinetobacter baumannii, Enterobacter cloacae, E. coli,    Klebsiella oxytoca, K. pneumoniae, Proteus sp.,    Serratia marcescens, Haemophilus influenzae,    Neisseria meningitidis, Pseudomonas aeruginosa, Candida    albicans, C. glabrata, C krusei, C parapsilosis,    C. tropicalis and the KPC resistance gene.  Organism: Blood Culture PCR (18 Aug 2020 13:38)  Organism: Blood Culture PCR (18 Aug 2020 13:38)    Culture - Blood (collected 17 Aug 2020 21:07)  Source: .Blood Blood-Peripheral  Preliminary Report (18 Aug 2020 22:02):    No growth to date.    Culture - Urine (collected 17 Aug 2020 19:52)  Source: .Urine Clean Catch (Midstream)  Final Report (18 Aug 2020 22:07):    <10,000 CFU/mL Normal Urogenital Hafsa        RADIOLOGY & ADDITIONAL TESTS:      < from: Xray Chest 2 Views PA/Lat (20 @ 22:29) >    EXAM:  XR CHEST PA LAT 2V        PROCEDURE DATE:  Aug 18 2020         INTERPRETATION:  CLINICAL INDICATION: Sepsis    TECHNIQUE: 2 views of the chest was obtained.    COMPARISON: Chest radiograph 2020.    FINDINGS:    Lungs are clear. No pleural effusion or pneumothorax.  Cardiac size is normal. No acute osseous finding.    IMPRESSION:  Clear lungs    < end of copied text >
PROGRESS NOTE:     CONTACT INFO:  Judith Lawrence MD   PGY-1  Pager: [phone] [facility]    Patient is a 20y old  Female who presents with a chief complaint of Enterococcus Positive blood culture (21 Aug 2020 15:02)      SUBJECTIVE / OVERNIGHT EVENTS:  No acute events overnight. Patient seen and evaluated at bedside. No headache. No fever/chills.  Denies SOB at rest, chest pain, palpitations, abdominal pain, nausea/vomiting    ADDITIONAL REVIEW OF SYSTEMS:    Negative except as above.     MEDICATIONS  (STANDING):  ampicillin  IVPB 2 Gram(s) IV Intermittent every 6 hours  enoxaparin Injectable 40 milliGRAM(s) SubCutaneous daily  nortriptyline 25 milliGRAM(s) Oral at bedtime    MEDICATIONS  (PRN):  acetaminophen    Suspension .. 650 milliGRAM(s) Oral every 6 hours PRN Temp greater or equal to 38C (100.4F), Mild Pain (1 - 3), Moderate Pain (4 - 6)      CAPILLARY BLOOD GLUCOSE        I&O's Summary      PHYSICAL EXAM:  Vital Signs Last 24 Hrs  T(C): 36.6 (21 Aug 2020 15:12), Max: 36.8 (20 Aug 2020 20:52)  T(F): 97.8 (21 Aug 2020 15:12), Max: 98.2 (20 Aug 2020 20:52)  HR: 96 (21 Aug 2020 15:12) (76 - 96)  BP: 110/74 (21 Aug 2020 15:12) (103/67 - 110/74)  BP(mean): --  RR: 17 (21 Aug 2020 15:12) (17 - 18)  SpO2: 98% (21 Aug 2020 15:12) (97% - 99%)    CONSTITUTIONAL: NAD, well-developed, pleasant, well-appearing  NEURO: No focal deficits, Avery's and Brudzinski's signs negative. EOMI.  RESPIRATORY: Normal respiratory effort; lungs are clear to auscultation bilaterally  CARDIOVASCULAR: Regular rate and rhythm, normal S1 and S2, no murmur/rub/gallop; No lower extremity edema; Peripheral pulses are 2+ bilaterally  ABDOMEN: Nontender to palpation, normoactive bowel sounds, no rebound/guarding; No hepatosplenomegaly  MUSCLOSKELETAL: no clubbing or cyanosis of digits; no joint swelling or tenderness to palpation  PSYCH: A+O to person, place, and time; affect appropriate    LABS:                        11.6   5.20  )-----------( 211      ( 21 Aug 2020 07:01 )             35.1     08-21    140  |  101  |  11  ----------------------------<  86  3.9   |  24  |  0.69    Ca    9.6      21 Aug 2020 07:01    TPro  7.2  /  Alb  4.2  /  TBili  0.3  /  DBili  x   /  AST  17  /  ALT  20  /  AlkPhos  57  08-21        Pregnancy Screen, Urine (08.20.20 @ 17:58)    Pregnancy Screen, Urine: NEGATIVE          Culture - Blood (collected 19 Aug 2020 02:26)  Source: .Blood Blood-Peripheral  Preliminary Report (20 Aug 2020 03:01):    No growth to date.    Culture - Blood (collected 19 Aug 2020 02:26)  Source: .Blood Blood-Peripheral  Preliminary Report (20 Aug 2020 03:01):    No growth to date.    Culture - Urine (collected 18 Aug 2020 23:49)  Source: .Urine Clean Catch (Midstream)  Final Report (20 Aug 2020 00:10):    <10,000 CFU/mL Normal Urogenital Hafsa        RADIOLOGY & ADDITIONAL TESTS:      < from: TTE with Doppler (w/Cont) (08.20.20 @ 17:39) >  CONCLUSIONS:  Technically difficult study.  1. Normal mitral valve. Minimal mitral regurgitation.  2. Normal left ventricular internal dimensions and wall  thicknesses.  3. Endocardium not well visualized; grossly normal left  ventricular systolic function.  Endocardial visualization  enhanced with intravenous injection of echo contrast  (Definity).  4. Unable to accurately evaluate right ventricular size or  systolic function.  Unable to exclude endocarditis.  Consider JILLIAN for further  evaluation, if clinically indicated.    < end of copied text >
PROGRESS NOTE:     CONTACT INFO:  Judith Lawrence MD   PGY-1  Pager: [phone] [facility]    Patient is a 20y old  Female who presents with a chief complaint of Positive blood culture (20 Aug 2020 13:12)      SUBJECTIVE / OVERNIGHT EVENTS:  No acute events overnight. Patient seen and evaluated at bedside. No fever/chills.  Denies SOB at rest, chest pain, palpitations, abdominal pain, nausea/vomiting. Reports that her headache from yesterday has resolved.     ADDITIONAL REVIEW OF SYSTEMS:    Negative except as above.     MEDICATIONS  (STANDING):  ampicillin  IVPB 2 Gram(s) IV Intermittent every 6 hours  enoxaparin Injectable 40 milliGRAM(s) SubCutaneous daily  nortriptyline 25 milliGRAM(s) Oral at bedtime    MEDICATIONS  (PRN):  acetaminophen    Suspension .. 650 milliGRAM(s) Oral every 6 hours PRN Temp greater or equal to 38C (100.4F), Mild Pain (1 - 3), Moderate Pain (4 - 6)      CAPILLARY BLOOD GLUCOSE        I&O's Summary      PHYSICAL EXAM:  Vital Signs Last 24 Hrs  T(C): 36.3 (20 Aug 2020 14:04), Max: 37.1 (20 Aug 2020 06:28)  T(F): 97.3 (20 Aug 2020 14:04), Max: 98.7 (20 Aug 2020 06:28)  HR: 73 (20 Aug 2020 14:04) (73 - 90)  BP: 121/53 (20 Aug 2020 14:04) (100/60 - 121/53)  BP(mean): --  RR: 17 (20 Aug 2020 14:04) (17 - 18)  SpO2: 98% (20 Aug 2020 14:04) (98% - 100%)    CONSTITUTIONAL: NAD, well-developed, pleasant, well-appearing  NEURO: No focal deficits, Avery's and Brudzinski's signs negative. EOMI.  RESPIRATORY: Normal respiratory effort; lungs are clear to auscultation bilaterally  CARDIOVASCULAR: Regular rate and rhythm, normal S1 and S2, no murmur/rub/gallop; No lower extremity edema; Peripheral pulses are 2+ bilaterally  ABDOMEN: Nontender to palpation, normoactive bowel sounds, no rebound/guarding; No hepatosplenomegaly  MUSCLOSKELETAL: no clubbing or cyanosis of digits; no joint swelling or tenderness to palpation  PSYCH: A+O to person, place, and time; affect appropriate    LABS:                        10.6   5.79  )-----------( 156      ( 19 Aug 2020 06:55 )             33.4     08-20    138  |  102  |  8   ----------------------------<  118<H>  3.7   |  23  |  0.62    Ca    9.7      20 Aug 2020 09:07  Phos  2.2     08-  Mg     2.0     08-    TPro  7.6  /  Alb  4.2  /  TBili  0.2  /  DBili  x   /  AST  21  /  ALT  24  /  AlkPhos  59  08-20    PT/INR - ( 18 Aug 2020 21:30 )   PT: 13.6 SEC;   INR: 1.20          PTT - ( 18 Aug 2020 21:30 )  PTT:29.7 SEC      Urinalysis Basic - ( 18 Aug 2020 21:30 )    Color: COLORLESS / Appearance: CLEAR / S.008 / pH: 7.0  Gluc: NEGATIVE / Ketone: NEGATIVE  / Bili: NEGATIVE / Urobili: NORMAL   Blood: NEGATIVE / Protein: NEGATIVE / Nitrite: NEGATIVE   Leuk Esterase: NEGATIVE / RBC: x / WBC x   Sq Epi: x / Non Sq Epi: x / Bacteria: x      Culture - Blood (collected 19 Aug 2020 02:26)  Source: .Blood Blood-Peripheral  Preliminary Report (20 Aug 2020 03:01):    No growth to date.    Culture - Blood (collected 19 Aug 2020 02:26)  Source: .Blood Blood-Peripheral  Preliminary Report (20 Aug 2020 03:01):    No growth to date.    Culture - Urine (collected 18 Aug 2020 23:49)  Source: .Urine Clean Catch (Midstream)  Final Report (20 Aug 2020 00:10):    <10,000 CFU/mL Normal Urogenital Hafsa    Culture - Blood (collected 17 Aug 2020 21:07)  Source: .Blood Blood-Peripheral  Preliminary Report (18 Aug 2020 22:02):    No growth to date.    Culture - Urine (collected 17 Aug 2020 19:52)  Source: .Urine Clean Catch (Midstream)  Final Report (18 Aug 2020 22:07):    <10,000 CFU/mL Normal Urogenital Hafsa    Culture - Blood (collected 17 Aug 2020 19:34)  Source: .Blood Blood-Peripheral  Gram Stain (18 Aug 2020 12:12):    Growth in aerobic bottle: Gram positive cocci in pairs  Final Report (20 Aug 2020 11:58):    Growth in aerobic bottle: Enterococcus gallinarum     Ferritin, Serum: 111.1 ng/mL (20 @ 11:30)    Iron with Total Binding Capacity (20 @ 11:30)    Iron Total, Serum: 20 ug/dL    Unsaturated Iron Binding Capacity: 232.9 ug/dL    Total Iron Binding Capacity.: 253 ug/dL    Reticulocyte Count (20 @ 11:30)    Reticulocyte Percent: 1.1 %    Absolute Reticulocytes: 51 k/uL    Vancomycin Level, Trough -Pre 4th Dose, order if dosed q6/8/12h (20 @ 09:07)    Vancomycin Level, Trough: 9.2    Lipid Profile in AM (20 @ 06:55)    Cholesterol, Serum: 164 mg/dL    Triglycerides, Serum: 68 mg/dL    HDL Cholesterol, Serum: 64 mg/dL    Direct LDL: 93:     Procalcitonin, Serum (20 @ 18:10)    Procalcitonin, Serum: 0.28        RADIOLOGY & ADDITIONAL TESTS:    < from: CT Abdomen and Pelvis w/ IV Cont (20 @ 22:21) >  IMPRESSION:  No acute pathology.    < end of copied text >

## 2020-08-22 LAB
CULTURE RESULTS: SIGNIFICANT CHANGE UP
SPECIMEN SOURCE: SIGNIFICANT CHANGE UP
STRONGYLOIDES AB SER-ACNC: NEGATIVE — SIGNIFICANT CHANGE UP

## 2020-08-24 LAB
CULTURE RESULTS: SIGNIFICANT CHANGE UP
CULTURE RESULTS: SIGNIFICANT CHANGE UP
SPECIMEN SOURCE: SIGNIFICANT CHANGE UP
SPECIMEN SOURCE: SIGNIFICANT CHANGE UP

## 2021-09-27 NOTE — ED PROVIDER NOTE - NSFOLLOWUPINSTRUCTIONS_ED_ALL_ED_FT
Yes - the patient is able to be screened No signs of emergency medical condition on today's workup.  Presumptive diagnosis made, but further evaluation may be required by your primary care doctor or specialist for a definitive diagnosis.    Please follow up with your primary care physician in 24-48 hours  A copy of your results have been provided to you  Please come back if any of the following: Fever, chest pain, shortness of breath, abdominal pain, nausea, vomiting, urinary or bowel irregularities.
